# Patient Record
Sex: FEMALE | Race: WHITE | Employment: OTHER | URBAN - METROPOLITAN AREA
[De-identification: names, ages, dates, MRNs, and addresses within clinical notes are randomized per-mention and may not be internally consistent; named-entity substitution may affect disease eponyms.]

---

## 2020-10-23 ENCOUNTER — HOSPITAL ENCOUNTER (OUTPATIENT)
Dept: PHYSICAL THERAPY | Age: 60
Discharge: HOME | End: 2020-10-23
Payer: COMMERCIAL

## 2020-10-23 PROCEDURE — 97110 THERAPEUTIC EXERCISES: CPT | Performed by: PHYSICAL THERAPIST

## 2020-10-23 PROCEDURE — 97162 PT EVAL MOD COMPLEX 30 MIN: CPT | Performed by: PHYSICAL THERAPIST

## 2020-10-23 PROCEDURE — 97140 MANUAL THERAPY 1/> REGIONS: CPT | Performed by: PHYSICAL THERAPIST

## 2020-10-23 NOTE — PROGRESS NOTES
PT  EVAL AND TREATMENT    Patient Name: Chrissy Ortiz  Date:10/23/2020  : 1960  [x]  Patient  Verified  Payor: Ashok Mendoza / Plan: Stella Flank / Product Type: Commerical /    In time:805am  Out time:900  Total Treatment Time (min): 55  Total Timed Codes (min): 55  1:1 Treatment Time ( [address] only): na   Visit #: 1 of 8-10    Treatment Area: Cervicalgia [M54.2]  Left shoulder pain [M25.512]  Right shoulder pain [M25.511]  Pain in: 4  Pain out 4  Hx:  Pt reports she was dx with osteopenia, and she was doing some of the therex the MD gave her (2020), when she felt a pop with resultant pain down between shoulder blades L>R. Pt went back to MD and was given Meloxicam and mm relaxer medication medication. Pt has hx of carpal tunnel since . Pt had to place sx on hold due to covid (for CT). Pt has Hx of c/s fusion 4-6. 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 No recent scans performed. Pt states she will have occasional pain into the 5th digit B, but states her main c/o daily HA and pain up into the head/neck. Pain increases with looking down and using arms (chopping food), lifting dishes into cabinet, transferirng from sit to stand. Mild sleep disturbances.     Objective evaluation:  Posture:decreased c/s lordosis, increased dowager hump at C7, increased t/s kyohosis  Strength: Strength:                                                                     L (1-5) R (1-5) Pain   Flexion 4 4+ [x] Yes   [] No   Abduction 4+ 4+ [x] Yes   [] No   External Rotation 4+ 4+ [] Yes   [] No   Internal Rotation 5 5 [] Yes   [] No   Mid Trap 4 4 [] Yes   [] No   Serratus Anterior NT NT [] Yes   [] No   Lower Trapezius 4- 4- [] Yes   [] No   Elbow Flexion 5 5 [] Yes   [] No   Elbow Extension 4+ 4+ [] Yes   [] No   Unable to perform  on L due to thumb arthritis (pt wearing splint)  ROM: C/S AROM: flex 75% , ext 22deg (mild pain mid shoulder blade L), R lat flex 25deg, L lat flex 25deg (sx in shld blade on L), R rot 25deg, L rot 30deg. UE FIR: decreased B ~T10  RRIS: increases Sx, RPIS: (protrusion): no change  Neurological: WNL, decreased sensation in Med nerve on R hand, mod to severe decreased nerve conduction with EMG per pt report, was scheduled for sx on R hand), R UE: neural tension in med and ulnar nerve. Palpation: L>R UT mm, C7/T1, B ant shoulders,      Justification for Eval Code Complexity:  Patient History : depression, HTn, OA, asthma, visually impaired  Examination see exam as above   Clinical Presentation: evolving  Clinical Decision Making : FOTO : 48 /100    Manual: 8 min  PA t/s mobes (mild), C/s manual traction, SOR, UT TrPR    Modality (rationale): decrease pain  []  E-Stim: type _ x _ min     []att   []unatt   []w/US   []w/ice   []w/heat  []  Traction: []cerv   []pelvic   _ lbs x _ min     []pro   []sup   []int   []const  []  Ultrasound: []cont   []pulse    _ W/cm2 x _  min   []1MHz   []3MHz  []  Iontophoresis: []take home patch w/ dexamethazone    []40mA   []80mA                               []_ mA min w/: []dexamethazone   []other:_  []  Ice pack _  min     [x] Hot pack 10_  min     [] Paraffin _  min  []  Other:     Patient Education: [x] Established HEP    [x] POT (minutes) :15 min HEP    ASSESSMENT  [x]  See Plan of Care    PLAN  [x]  Upgrade activities as tolerated     [x] Other:_ POC  Patient to be seen 2-3 /wk for 8-10 treatments.        Isabell Yeager, PT 10/23/2020  7:57 AM

## 2020-10-23 NOTE — PROGRESS NOTES
[address] [facility] AT [facility]  [address] [place] [address], [address], [address]  Phone [phone]  Fax [phone] / [address] [address] PHYSICAL THERAPY SERVICES  Patient Name: Robyn Munoz : 1960   Medical   Diagnosis: Cervicalgia [M54.2]  Left shoulder pain [M25.512]  Right shoulder pain [M25.511] Treatment Diagnosis: C7/T1 HNP   Onset Date: 2020     Referral Source: Akiko Philippe MD Start of [facility]): 10/23/2020   Prior Hospitalization: See medical history Provider #: [id number]   Prior Level of Function: Indep    Comorbidities: depression, HTn, OA, asthma, visually impaired   Medications: Verified on Patient Summary List   The Plan of Care and following information is based on the information from the initial evaluation.   =================================================================================  Assessment / key information:  Patient is a 62 y.o. female who presents to [facility] with Dx of C7/T1 HNP. Pt reports she was dx with osteopenia, and she was doing some of the therex the MD gave her (2020), when she felt a pop with resultant pain down between shoulder blades L>R. Pt went back to MD and was given Meloxicam and mm relaxer medication medication. Pt has hx of carpal tunnel since . Pt had to place sx on hold due to covid (for CT). Pt has Hx of c/s fusion 4-6. 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 No recent scans performed. Pt states she will have occasional pain into the 5th digit B, but states her main c/o daily HA and pain up into the head/neck. Pain increases with looking down and using arms (chopping food), lifting dishes into cabinet, transferirng from sit to stand.  Mild sleep disturbances.     Objective evaluation:  Posture:decreased c/s lordosis, increased dowager hump at C7, increased t/s kyohosis  Strength: Strength:   L (1-5) R (1-5) Pain   Flexion 4 4+ [x]? Yes   []? No   Abduction 4+ 4+ [x]? Yes   []? No   External Rotation 4+ 4+ []? Yes   []? No   Internal Rotation 5 5 []? Yes   []? No   Mid Trap 4 4 []? Yes   []? No   Serratus Anterior NT NT []? Yes   []? No   Lower Trapezius 4- 4- []? Yes   []? No   Elbow Flexion 5 5 []? Yes   []? No   Elbow Extension 4+ 4+ []? Yes   []? No   Unable to perform  on L due to thumb arthritis (pt wearing splint)  ROM: C/S AROM: flex 75% , ext 22deg (mild pain mid shoulder blade L), R lat flex 25deg, L lat flex 25deg (sx in shld blade on L), R rot 25deg, L rot 30deg. UE FIR: decreased B ~T10  RRIS: increases Sx, RPIS: (protrusion): no change  Neurological: WNL, decreased sensation in Med nerve on R hand, mod to severe decreased nerve conduction with EMG per pt report, was scheduled for sx on R hand), R UE: neural tension in med and ulnar nerve. Palpation: L>R UT mm, C7/T1, B ant shoulders. Patient scored 48 on FOTO indicating decreased functional activity level and QOL. A home exercise program was demonstrated and provided to address the above objective and functional deficits.  Patient can benefit from PT interventions to improve radicular sx, flexibility decrease pain , to facilitate ADLs & overall functional status.   =================================================================================  Eval Complexity: History: MEDIUM  Complexity : 1-2 comorbidities / personal factors will impact the outcome/ POC Exam:MEDIUM Complexity : 3 Standardized tests and measures addressing body structure, function, activity limitation and / or participation in recreation  Presentation: MEDIUM Complexity : Evolving with changing characteristics  Clinical Decision Making:MEDIUM Complexity : FOTO score of 26-74Overall Complexity:MEDIUM  Problem List: pain affecting function, decrease ROM, decrease strength, decrease ADL/ functional abilitiies, decrease activity tolerance and decrease flexibility/ joint mobility   Treatment Plan may include any combination of the following: Therapeutic exercise, Therapeutic activities, Neuromuscular re-education, Physical agent/modality, Gait/balance training, Manual therapy, Aquatic therapy, Patient education, Self Care training, Functional mobility training, Home safety training and Stair training  Patient / Family readiness to learn indicated by: asking questions, trying to perform skills and interest  Persons(s) to be included in education: patient (P)  Barriers to Learning/Limitations: None  Measures taken:    Patient Goal (s): Some easing of symptoms   Patient self reported health status: good  Rehabilitation Potential: good   Short Term Goals: To be accomplished in  2  weeks:  1. Patient will be compliant with HEP for sx management to address the above listed deficits. .  2. Pt to have 50% reduction in radicular sx and HA  to facilitate prolonged positioning for sewing.  Long Term Goals: To be accomplished in  8-10  treatments:  1. Patient to be independent & compliant with HEP in preparation for D/C.   2. Patient to increase FOTO score to 56 indicating improved functional abilities and QOL. 3. Patient to report 75% reduction in radicular sx and HA  to facilitate prolonged positioning for sewing. 4. Patient to increase scapular strength to 5 to facilitate stability at cervico-thoracic junction for lifting and using UEs. Frequency / Duration:   Patient to be seen  2-3  times per week for 8-10  treatments:  Patient / Caregiver education and instruction: activity modification and exercises    Therapist Signature: Morteza Calvo PT Date: 26/18/7830   Certification Period: na Time: 8:00 AM   ===========================================================================================  I certify that the above Physical Therapy Services are being furnished while the patient is under my care.   I agree with the treatment plan and certify that this therapy is necessary. Physician Signature:        Date:       Time:     Please sign and return to [facility] or you may fax the signed copy to [phone]. Thank you.

## 2020-10-26 ENCOUNTER — HOSPITAL ENCOUNTER (OUTPATIENT)
Dept: PHYSICAL THERAPY | Age: 60
Discharge: HOME | End: 2020-10-26
Payer: COMMERCIAL

## 2020-10-26 PROCEDURE — 97014 ELECTRIC STIMULATION THERAPY: CPT

## 2020-10-26 PROCEDURE — 97110 THERAPEUTIC EXERCISES: CPT

## 2020-10-26 PROCEDURE — 97140 MANUAL THERAPY 1/> REGIONS: CPT

## 2020-10-26 NOTE — PROGRESS NOTES
PT DAILY TREATMENT NOTE     Patient Name: Jody Irby  Date:10/26/2020  : 1960  [x]  Patient  Verified  Payor: Lion Bojorquez / Plan: Ty Cruz / Product Type: Commerical /    In time:8:30Out time:9:36  Total Treatment Time (min): 66    Visit #: 2 of     Treatment Area: Cervicalgia [M54.2]  Left shoulder pain [M25.512]  Right shoulder pain [M25.511]    SUBJECTIVE  Pain Level IN: (0-10 scale): 3-4/10   Pain Level OUT: (0-10 scale) post treatment: 2/10    Any medication changes, allergies to medications, adverse drug reactions, diagnosis change, or new procedure performed?: [x] No    [] Yes (see summary sheet for update)  Subjective functional status/changes:   [] No changes reported  I have this horrible pain between my shoulder blades; the (L) side is worse than the (R) and then I will get this pain radiating from my shoulder blade to under my arm pit and down to my pinky at times - it is horrible       OBJECTIVE    Modalities Rationale:     decrease inflammation, decrease pain and increase tissue extensibility to improve patient's ability to perform normal ADLs without increase pain or discomfort   15+5 min [x] Estim, type/location: premod to (B) UT and mid scap in prone                                      []  att     [x]  unatt     []  w/US     []  w/ice    []  w/heat    min []  Mechanical Traction: type/lbs                   []  pro   []  sup   []  int   []  cont    []  before manual    []  after manual    min []  Ultrasound, settings/location:      min []  Iontophoresis w/ dexamethasone, location:                                               []  take home patch       []  in clinic    min []  Ice     []  Heat    location/position:     min []  Vasopneumatic Device, press/temp:     min []  Other:    [] Skin assessment post-treatment (if applicable):    []  intact    []  redness- no adverse reaction     []redness  adverse reaction:             31 min Therapeutic Exercise:  [x] See flow sheet : first follow up visit since initial evaluation - initiated POC per flow sheet    Rationale: increase ROM and increase strength to improve the patients ability to achieve all goals stated below     15 min Manual Therapy: Technique:      [] S/DTM []IASTM []PROM [] Passive Stretching   [] Graston:  [] GT 1  [] GT 2 [] GT 3 [] GT 4 [] GT 4 [] GT 5  [] GT 6  [] Sweep [] Fan [] [place]  [] Brush   []  Swivel []J- Stroke [] Scoop []IFraming     []Manual TPR  [] TDN (see objective; actual needle insertion time not billed)  []Jt manipulation:Gr I [] II []  III [] IV[] V[]  Treatment/Area: DTM to (B) upper thoracic  And TPR to thoracic area approx T1-4 in prone    Rationale:      decrease pain, increase ROM, increase tissue extensibility and decrease trigger points to improve patient's ability to perform normal ADL with less pain and less dysfunction         With   [] TE   [] TA   [] neuro   [] other: Patient Education: [x] Review HEP    [] Progressed/Changed HEP based on:   [] positioning   [] body mechanics   [] transfers   [] heat/ice application    [] Graston Education: Explained the effects and benefits of Graston Technique therapy including potential for post treatment soreness and bruising.   [] Other:           Objective/Functional Measures with Therapist Assessment Noted with Response to Therapy Session:     first follow up visit since initial evaluation -       initiated POC per flow sheet   Patient presents with extreme tightness and painful trigger points directly below CT junction and along the T1-4 transverse process borders   May attempt gentle mobs and MET to assist with correction to area next visit secondary: patient appear to present with transverse process at T2 is protruding posterior on the right with patient in prone with increase pain with palpation over area compare to pain that patient reports that refers into the (L) UE     ASSESSMENT/Changes in Function:     Patient will continue to benefit from skilled PT services to modify and progress therapeutic interventions, address functional mobility deficits, address ROM deficits, address strength deficits, analyze and address soft tissue restrictions, analyze and cue movement patterns, analyze and modify body mechanics/ergonomics and assess and modify postural abnormalities to attain remaining goals. [x]  See Plan of Care  []  See progress note/recertification  []  See Discharge Summary         Progress towards goals / Updated goals: · Short Term Goals: To be accomplished in  2  weeks:  1. Patient will be compliant with HEP for sx management to address the above listed deficits. .  2. Pt to have 50% reduction in radicular sx and HA  to facilitate prolonged positioning for sewing. · Long Term Goals: To be accomplished in  8-10  treatments:  1. Patient to be independent & compliant with HEP in preparation for D/C.   2. Patient to increase FOTO score to 56 indicating improved functional abilities and QOL. 3. Patient to report 75% reduction in radicular sx and HA  to facilitate prolonged positioning for sewing. 4. Patient to increase scapular strength to 5 to facilitate stability at cervico-thoracic junction for lifting and using UEs.     PLAN  [x]  Upgrade activities as tolerated     [x]  Continue plan of care  []  Update interventions per flow sheet       []  Discharge due to:_  []  Other:_      Wes Mandujano PTA 10/26/2020  8:52 AM    Future Appointments   Date Time Provider Kinjal Miramontes   10/28/2020  4:00 PM Saint Satchel, PT [facility]   11/4/2020  8:30 AM [facility] PT [facility] [facility]   11/6/2020  7:45 AM [facility], PTA [facility]   11/9/2020  9:15 AM Tyson Faith DPT [facility]   11/12/2020  9:15 AM Tyson Faith, DPT [facility]   11/16/2020  9:15 AM [facility], PTA [facility]   11/19/2020  7:45 AM Kasey Pimentel [address] [place] [address],[address]

## 2020-10-28 ENCOUNTER — HOSPITAL ENCOUNTER (OUTPATIENT)
Dept: PHYSICAL THERAPY | Age: 60
Discharge: HOME | End: 2020-10-28
Payer: COMMERCIAL

## 2020-10-28 PROCEDURE — 97014 ELECTRIC STIMULATION THERAPY: CPT | Performed by: PHYSICAL THERAPIST

## 2020-10-28 PROCEDURE — 97140 MANUAL THERAPY 1/> REGIONS: CPT | Performed by: PHYSICAL THERAPIST

## 2020-10-28 PROCEDURE — 97110 THERAPEUTIC EXERCISES: CPT | Performed by: PHYSICAL THERAPIST

## 2020-10-28 PROCEDURE — 97012 MECHANICAL TRACTION THERAPY: CPT | Performed by: PHYSICAL THERAPIST

## 2020-10-28 NOTE — PROGRESS NOTES
PT DAILY TREATMENT NOTE     Patient Name: Colette Billings  Date:10/28/2020  : 1960  [x]  Patient  Verified  Payor: Kai Osorio / Plan: Joseph Villaseñor / Product Type: Commerical /    In time:400pm  Out time: 512pm  Total Treatment Time (min): 72    Visit #: 3 of     Treatment Area: Cervicalgia [M54.2]  Left shoulder pain [M25.512]  Right shoulder pain [M25.511]    SUBJECTIVE  Pain Level IN: (0-10 scale): 4/10   Pain Level OUT: (0-10 scale) post treatment: 0/10    Any medication changes, allergies to medications, adverse drug reactions, diagnosis change, or new procedure performed?: [x] No    [] Yes (see summary sheet for update)  Subjective functional status/changes:   [] No changes reported  I had one incidence with the radiating pain yesterday. I felt great after treatment for about 3 hours last time, but then the pain came back.      OBJECTIVE    Modalities Rationale:     decrease inflammation, decrease pain and increase tissue extensibility to improve patient's ability to perform normal ADLs without increase pain or discomfort   15+5 min [x] Estim, type/location: premod to (B) UT and mid scap in prone                                      []  att     [x]  unatt     []  w/US     []  w/ice    []  w/heat   15+3 min [x]  Mechanical Traction: type/lbs  15lbs/8lbs                   []  pro   []  sup   []  int   []  cont    []  before manual    []  after manual    min []  Ultrasound, settings/location:      min []  Iontophoresis w/ dexamethasone, location:                                               []  take home patch       []  in clinic    min []  Ice     []  Heat    location/position:     min []  Vasopneumatic Device, press/temp:     min []  Other:    [] Skin assessment post-treatment (if applicable):    []  intact    []  redness- no adverse reaction     []redness  adverse reaction:             19 min Therapeutic Exercise:  [x] See flow sheet :    Rationale: increase ROM and increase strength to improve the patients ability to achieve all goals stated below     15 min Manual Therapy: Technique:      [] S/DTM []IASTM []PROM [] Passive Stretching   [] Graston:  [] GT 1  [] GT 2 [] GT 3 [] GT 4 [] GT 4 [] GT 5  [] GT 6  [] Sweep [] Fan [] [place]  [] Brush   []  Swivel []J- Stroke [] Scoop []IFraming     []Manual TPR  [] TDN (see objective; actual needle insertion time not billed)  []Jt manipulation:Gr I [] II []  III [] IV[] V[]  Treatment/Area: DTM to (B) upper thoracic  And TPR to thoracic area approx T1-4 in prone    Rationale:      decrease pain, increase ROM, increase tissue extensibility and decrease trigger points to improve patient's ability to perform normal ADL with less pain and less dysfunction         With   [] TE   [] TA   [] neuro   [] other: Patient Education: [x] Review HEP    [] Progressed/Changed HEP based on:   [] positioning   [] body mechanics   [] transfers   [] heat/ice application    [] Graston Education: Explained the effects and benefits of Graston Technique therapy including potential for post treatment soreness and bruising. [] Other:           Objective/Functional Measures with Therapist Assessment Noted with Response to Therapy Session:       ASSESSMENT/Changes in Function:   [facility], to assess is she is to start home traction with her [place] unit. P  Patient will continue to benefit from skilled PT services to modify and progress therapeutic interventions, address functional mobility deficits, address ROM deficits, address strength deficits, analyze and address soft tissue restrictions, analyze and cue movement patterns, analyze and modify body mechanics/ergonomics and assess and modify postural abnormalities to attain remaining goals. [x]  See Plan of Care  []  See progress note/recertification  []  See Discharge Summary         Progress towards goals / Updated goals: · Short Term Goals: To be accomplished in  2  weeks:  1.  Patient will be compliant with HEP for sx management to address the above listed deficits. .  2. Pt to have 50% reduction in radicular sx and HA  to facilitate prolonged positioning for sewing. · Long Term Goals: To be accomplished in  8-10  treatments:  1. Patient to be independent & compliant with HEP in preparation for D/C.   2. Patient to increase FOTO score to 56 indicating improved functional abilities and QOL. 3. Patient to report 75% reduction in radicular sx and HA  to facilitate prolonged positioning for sewing. 4. Patient to increase scapular strength to 5 to facilitate stability at cervico-thoracic junction for lifting and using UEs.     PLAN  [x]  Upgrade activities as tolerated     [x]  Continue plan of care  []  Update interventions per flow sheet       []  Discharge due to:_  []  Other:_      Jenny Garcias, PT 10/28/2020  8:52 AM    Future Appointments   Date Time Provider Kinjal Miramontes   10/28/2020  4:00 PM Savanah Mendez, PT [facility]   11/4/2020  8:30 AM [facility] PT [place] [facility]   11/6/2020  7:45 AM Prosper Horta PTA [facility]   11/9/2020  9:15 AM TELMA LandisT [facility]   11/12/2020  9:15 AM Tatiana Hanson DPT [facility]   11/16/2020  9:15 AM Prosper Horta PTA [facility]   11/19/2020  7:45 AM Geremias Anthony, DPT [facility]

## 2020-11-04 ENCOUNTER — HOSPITAL ENCOUNTER (OUTPATIENT)
Dept: PHYSICAL THERAPY | Age: 60
Discharge: HOME | End: 2020-11-04
Payer: COMMERCIAL

## 2020-11-04 PROCEDURE — 97140 MANUAL THERAPY 1/> REGIONS: CPT | Performed by: GENERAL ACUTE CARE HOSPITAL

## 2020-11-04 PROCEDURE — 97014 ELECTRIC STIMULATION THERAPY: CPT | Performed by: GENERAL ACUTE CARE HOSPITAL

## 2020-11-04 PROCEDURE — 97110 THERAPEUTIC EXERCISES: CPT | Performed by: GENERAL ACUTE CARE HOSPITAL

## 2020-11-04 NOTE — PROGRESS NOTES
PT DAILY TREATMENT NOTE     Patient Name: Lindsey Landa  Date:2020  : 1960  [x]  Patient  Verified  Payor: Fartun Crawley / Plan: Carlos Glover / Product Type: Commerical /    In time: 352 Out time: 926  Total Treatment Time (min): 53  Visit #: 4 of     Treatment Area: Cervicalgia [M54.2]  Left shoulder pain [M25.512]  Right shoulder pain [M25.511]    SUBJECTIVE  Pain Level IN: (0-10 scale): 3/10   Pain Level OUT: (0-10 scale) post treatment: 0/10     Any medication changes, allergies to medications, adverse drug reactions, diagnosis change, or new procedure performed?: [x] No    [] Yes (see summary sheet for update)  Subjective functional status/changes:   [] No changes reported  Pt reports having muscle spasms for 2 days after performing the cervical traction last session. States she had to take a muscle relaxer to finally get relief.      OBJECTIVE  Modalities Rationale:     decrease inflammation, decrease pain and increase tissue extensibility to improve patient's ability to perform normal ADLs without increase pain or discomfort   15 min [x] Estim, type/location: premod to (B) UT and mid scap in prone                                      []  att     [x]  unatt     []  w/US     []  w/ice    []  w/heat   Held  min [x]  Mechanical Traction: type/lbs  15lbs/8lbs                   []  pro   []  sup   []  int   []  cont    []  before manual    []  after manual    min []  Ultrasound, settings/location:      min []  Iontophoresis w/ dexamethasone, location:                                               []  take home patch       []  in clinic    min []  Ice     []  Heat    location/position:     min []  Vasopneumatic Device, press/temp:     min []  Other:    [] Skin assessment post-treatment (if applicable):    []  intact    []  redness- no adverse reaction     []redness  adverse reaction:             23 min Therapeutic Exercise:  [x] See flow sheet :    Rationale: increase ROM and increase strength to improve the patients ability to achieve all goals stated below     15 min Manual Therapy: Technique:      [] S/DTM []IASTM []PROM [] Passive Stretching   [] Graston:  [] GT 1  [] GT 2 [] GT 3 [] GT 4 [] GT 4 [] GT 5  [] GT 6  [] Sweep [] Fan [] [place]  [] Brush   []  Swivel []J- Stroke [] Scoop []IFraming     []Manual TPR  [] TDN (see objective; actual needle insertion time not billed)  []Jt manipulation:Gr I [] II []  III [] IV[] V[]  Treatment/Area: DTM to (B) UT, LS, thoracic paraspinals, rhonboids. TPR to thoracic area approx T1-4 in prone    Rationale:      decrease pain, increase ROM, increase tissue extensibility and decrease trigger points to improve patient's ability to perform normal ADL with less pain and less dysfunction         With   [] TE   [] TA   [] neuro   [] other: Patient Education: [x] Review HEP    [] Progressed/Changed HEP based on:   [] positioning   [] body mechanics   [] transfers   [] heat/ice application    [] Graston Education: Explained the effects and benefits of Graston Technique therapy including potential for post treatment soreness and bruising. [] Other:      Objective/Functional Measures with Therapist Assessment Noted with Response to Therapy Session:   Added TB rows    ASSESSMENT/Changes in Function:   Pt reported an adverse effect from cervical traction, therefore held this today. Good response to postural corrective exercises to address upper back symptoms. Reports an overall improvement in radicular s/sx since beginning physical therapy. Noted trigger points in B UT with increased tenderness on R > L side, though patients pain is typically on L side.      Patient will continue to benefit from skilled PT services to modify and progress therapeutic interventions, address functional mobility deficits, address ROM deficits, address strength deficits, analyze and address soft tissue restrictions, analyze and cue movement patterns, analyze and modify body mechanics/ergonomics and assess and modify postural abnormalities to attain remaining goals. [x]  See Plan of Care  []  See progress note/recertification  []  See Discharge Summary         Progress towards goals / Updated goals: · Short Term Goals: To be accomplished in  2  weeks:  1. Patient will be compliant with HEP for sx management to address the above listed deficits. .  2. Pt to have 50% reduction in radicular sx and HA  to facilitate prolonged positioning for sewing. Fluctuating; reports general improvement since starting PT (11/4/20)  · Long Term Goals: To be accomplished in  8-10  treatments:  1. Patient to be independent & compliant with HEP in preparation for D/C.   2. Patient to increase FOTO score to 56 indicating improved functional abilities and QOL. 3. Patient to report 75% reduction in radicular sx and HA  to facilitate prolonged positioning for sewing. 4. Patient to increase scapular strength to 5 to facilitate stability at cervico-thoracic junction for lifting and using UEs.     PLAN  [x]  Upgrade activities as tolerated     [x]  Continue plan of care  []  Update interventions per flow sheet       []  Discharge due to:_  []  Other:_      Cherylene Latin, PT 11/4/2020  8:52 AM    Future Appointments   Date Time Provider Kinjal Miramontes   11/6/2020  7:45 AM Jaylene Mortimer, PTA [facility]   11/9/2020  9:15 AM [facility] PT [facility]   11/12/2020  9:15 AM Adelaida Koroma DPT [facility]   11/16/2020  9:15 AM Jaylene Mortimer, PTA [facility]   11/19/2020  7:45 AM Livier Coles DPT [facility]

## 2020-11-06 ENCOUNTER — APPOINTMENT (OUTPATIENT)
Dept: PHYSICAL THERAPY | Age: 60
End: 2020-11-06
Payer: COMMERCIAL

## 2020-11-09 ENCOUNTER — HOSPITAL ENCOUNTER (OUTPATIENT)
Dept: PHYSICAL THERAPY | Age: 60
Discharge: HOME | End: 2020-11-09
Payer: COMMERCIAL

## 2020-11-09 PROCEDURE — 97110 THERAPEUTIC EXERCISES: CPT

## 2020-11-09 PROCEDURE — 97014 ELECTRIC STIMULATION THERAPY: CPT

## 2020-11-09 PROCEDURE — 97140 MANUAL THERAPY 1/> REGIONS: CPT

## 2020-11-09 NOTE — PROGRESS NOTES
PT DAILY TREATMENT NOTE     Patient Name: Carlos Rossi  Date:2020  : 1960  [x]  Patient  Verified  Payor: Thea Galeano / Plan: Haylee Tucker / Product Type: Commerical /    In time: 9:17 Out time: 10:15  Total Treatment Time (min): 58  Visit #: 5 of     Treatment Area: Cervicalgia [M54.2]  Left shoulder pain [M25.512]  Right shoulder pain [M25.511]    SUBJECTIVE  Pain Level IN: (0-10 scale): 4/10  Pain Level OUT: (0-10 scale) post treatment:1/10    Any medication changes, allergies to medications, adverse drug reactions, diagnosis change, or new procedure performed?: [x] No    [] Yes (see summary sheet for update)  Subjective functional status/changes:   [] No changes reported  That spot is still there -between my shoulder blades it really hasn't changes at all     OBJECTIVE  Modalities Rationale:     decrease inflammation, decrease pain and increase tissue extensibility to improve patient's ability to perform normal ADLs without increase pain or discomfort   15 min [x] Estim, type/location: premod to (B) UT and mid scap in prone                                      []  att     [x]  unatt     []  w/US     [x]  w/ice    []  w/heat   Held  min [x]  Mechanical Traction: type/lbs  15lbs/8lbs                   []  pro   []  sup   []  int   []  cont    []  before manual    []  after manual    min []  Ultrasound, settings/location:      min []  Iontophoresis w/ dexamethasone, location:                                               []  take home patch       []  in clinic    min []  Ice     []  Heat    location/position:     min []  Vasopneumatic Device, press/temp:     min []  Other:    [] Skin assessment post-treatment (if applicable):    []  intact    []  redness- no adverse reaction     []redness  adverse reaction:             23/18 min Therapeutic Exercise:  [x] See flow sheet : 5 min NC for warm up    Rationale: increase ROM and increase strength to improve the patients ability to achieve all goals stated below     20 min Manual Therapy: Technique:      [] S/DTM []IASTM []PROM [] Passive Stretching   [] Graston:  [] GT 1  [] GT 2 [] GT 3 [] GT 4 [] GT 4 [] GT 5  [] GT 6  [] Sweep [] Fan [] [place]  [] Brush   []  Swivel []J- Stroke [] Scoop []IFraming     []Manual TPR  [] TDN (see objective; actual needle insertion time not billed)  []Jt manipulation:Gr I [] II []  III [] IV[] V[]  Treatment/Area: DTM to (B) UT, LS, thoracic paraspinals, rhonboids. TPR to thoracic area approx T1-4 in prone in sitting performed MET to approx T2-4 for (L) rotation lesion and (L) posterior rib    Rationale:      decrease pain, increase ROM, increase tissue extensibility and decrease trigger points to improve patient's ability to perform normal ADL with less pain and less dysfunction         With   [] TE   [] TA   [] neuro   [] other: Patient Education: [x] Review HEP    [] Progressed/Changed HEP based on:   [] positioning   [] body mechanics   [] transfers   [] heat/ice application    [] Graston Education: Explained the effects and benefits of Graston Technique therapy including potential for post treatment soreness and bruising. [] Other:      Objective/Functional Measures with Therapist Assessment Noted with Response to Therapy Session:  Secondary to no real change in overall status - checked for rib dysfunction and rotation lesion to the upper thoracic spine over the (L) side - secondary to patient reported a \"pop\" in this area prior to onset of symptoms   Patient presented with (L) rotation lesion and (L) side posterior rib dysfunction - in sitting performed MET to approx T2-4 for (L) rotation lesion and (L) posterior rib   Patient reported decreased pain with palpation over this area after performing METS    GOALS   1. Patient will be compliant with HEP for sx management to address the above listed deficits. .patient reports compliance of HEP 11/9/2020    ASSESSMENT/Changes in Function:   Patient will continue to benefit from skilled PT services to modify and progress therapeutic interventions, address functional mobility deficits, address ROM deficits, address strength deficits, analyze and address soft tissue restrictions, analyze and cue movement patterns, analyze and modify body mechanics/ergonomics and assess and modify postural abnormalities to attain remaining goals. [x]  See Plan of Care  []  See progress note/recertification  []  See Discharge Summary         Progress towards goals / Updated goals: · Short Term Goals: To be accomplished in  2  weeks:  1. Patient will be compliant with HEP for sx management to address the above listed deficits. .patient reports compliance of HEP 11/9/2020  2. Pt to have 50% reduction in radicular sx and HA  to facilitate prolonged positioning for sewing. Fluctuating; reports general improvement since starting PT (11/4/20)  · Long Term Goals: To be accomplished in  8-10  treatments:  1. Patient to be independent & compliant with HEP in preparation for D/C.   2. Patient to increase FOTO score to 56 indicating improved functional abilities and QOL. 3. Patient to report 75% reduction in radicular sx and HA  to facilitate prolonged positioning for sewing. 4. Patient to increase scapular strength to 5 to facilitate stability at cervico-thoracic junction for lifting and using UEs.     PLAN  [x]  Upgrade activities as tolerated     [x]  Continue plan of care  []  Update interventions per flow sheet       []  Discharge due to:_  []  Other:_      Fawn Stephenson PTA 11/9/2020  8:52 AM    Future Appointments   Date Time Provider Kinjal Miramontes   11/12/2020  9:15 AM Jayashree Moses DPT [facility]   11/16/2020  9:15 AM Anne Day PTA [facility]   11/19/2020  7:45 AM TELMA WeinerT [facility]

## 2020-11-12 ENCOUNTER — APPOINTMENT (OUTPATIENT)
Dept: PHYSICAL THERAPY | Age: 60
End: 2020-11-12
Payer: COMMERCIAL

## 2020-11-13 ENCOUNTER — HOSPITAL ENCOUNTER (OUTPATIENT)
Dept: PHYSICAL THERAPY | Age: 60
Discharge: HOME | End: 2020-11-13
Payer: COMMERCIAL

## 2020-11-13 PROCEDURE — 97140 MANUAL THERAPY 1/> REGIONS: CPT

## 2020-11-13 PROCEDURE — 97014 ELECTRIC STIMULATION THERAPY: CPT

## 2020-11-13 PROCEDURE — 97035 APP MDLTY 1+ULTRASOUND EA 15: CPT

## 2020-11-13 PROCEDURE — 97110 THERAPEUTIC EXERCISES: CPT

## 2020-11-13 NOTE — PROGRESS NOTES
PT DAILY TREATMENT NOTE     Patient Name: Chepe Gagnon  Date:2020  : 1960  [x]  Patient  Verified  Payor: Kellee Youngblood / Plan: Bill Woods / Product Type: Commerical /    In time: 7:47 Out time: 8:53  Total Treatment Time (min): 77  Visit #: 6 of     Treatment Area: Cervicalgia [M54.2]  Left shoulder pain [M25.512]  Right shoulder pain [M25.511]    SUBJECTIVE  Pain Level IN: (0-10 scale): 5-6/10  Pain Level OUT: (0-10 scale) post treatment:3/10  Any medication changes, allergies to medications, adverse drug reactions, diagnosis change, or new procedure performed?: [x] No    [] Yes (see summary sheet for update)  Subjective functional status/changes:   [] No changes reported  I felt good after I was here but then I have been really sore since - I got the x-ray done so hopefully they will now approve a MRI for it    OBJECTIVE  Modalities Rationale:     decrease inflammation, decrease pain and increase tissue extensibility to improve patient's ability to perform normal ADLs without increase pain or discomfort   15 min [x] Estim, type/location: premod to (B) UT and mid scap in prone                                      []  att     [x]  unatt     []  w/US     [x]  w/ice    []  w/heat   Held  min [x]  Mechanical Traction: type/lbs  15lbs/8lbs                   []  pro   []  sup   []  int   []  cont    []  before manual    []  after manual   8 min [x]  Ultrasound, settings/location:  (L) thoracic area approx T3-6    min []  Iontophoresis w/ dexamethasone, location:                                               []  take home patch       []  in clinic    min []  Ice     []  Heat    location/position:     min []  Vasopneumatic Device, press/temp:     min []  Other:    [] Skin assessment post-treatment (if applicable):    []  intact    []  redness- no adverse reaction     []redness  adverse reaction:             28/23 min Therapeutic Exercise:  [x] See flow sheet : 5 min NC for warm up   Added laying supine over full foam roll for 2 min  Thoracic extension over 1/2 foam   Added high \"T\" and then \"M\" with t-band     Rationale: increase ROM and increase strength to improve the patients ability to achieve all goals stated below     15 min Manual Therapy: Technique:      [] S/DTM []IASTM []PROM [] Passive Stretching   [] Graston:  [] GT 1  [] GT 2 [] GT 3 [] GT 4 [] GT 4 [] GT 5  [] GT 6  [] Sweep [] Fan [] [place]  [] Brush   []  Swivel []J- Stroke [] Scoop []IFraming     []Manual TPR  [] TDN (see objective; actual needle insertion time not billed)  []Jt manipulation:Gr I [] II []  III [] IV[] V[]  Treatment/Area: DTM to (B) UT, LS, thoracic paraspinals, rhomboids. TPR to thoracic area approx T1-4 in prone in sitting performed MET to approx T2-4 for (L)) and (R) rotation lesion and in supine AP mobs to (L) rib and T2-4 and PA mobs to t-spine in prone    Rationale:      decrease pain, increase ROM, increase tissue extensibility and decrease trigger points to improve patient's ability to perform normal ADL with less pain and less dysfunction         With   [] TE   [] TA   [] neuro   [] other: Patient Education: [x] Review HEP    [] Progressed/Changed HEP based on:   [] positioning   [] body mechanics   [] transfers   [] heat/ice application    [] Graston Education: Explained the effects and benefits of Graston Technique therapy including potential for post treatment soreness and bruising.   [] Other:      Objective/Functional Measures with Therapist Assessment Noted with Response to Therapy Session:  Presents with decrease palpable rotation lesion with less pain with palpation to approx T4 area prior to MET and mobs to area   Patient reports \"tenderness\" to (L) T4 area after MET and mobs to the soft tissue area - performed US to area to assist with decreasing muscle soreness/tenderness   Added additional exercises to assist with thoracic mobs - see above     ASSESSMENT/Changes in Function:   Patient will continue to benefit from skilled PT services to modify and progress therapeutic interventions, address functional mobility deficits, address ROM deficits, address strength deficits, analyze and address soft tissue restrictions, analyze and cue movement patterns, analyze and modify body mechanics/ergonomics and assess and modify postural abnormalities to attain remaining goals. [x]  See Plan of Care  []  See progress note/recertification  []  See Discharge Summary         Progress towards goals / Updated goals: · Short Term Goals: To be accomplished in  2  weeks:  1. Patient will be compliant with HEP for sx management to address the above listed deficits. .patient reports compliance of HEP 11/9/2020  2. Pt to have 50% reduction in radicular sx and HA  to facilitate prolonged positioning for sewing. Fluctuating; reports general improvement since starting PT (11/4/20)  · Long Term Goals: To be accomplished in  8-10  treatments:  1. Patient to be independent & compliant with HEP in preparation for D/C.   2. Patient to increase FOTO score to 56 indicating improved functional abilities and QOL. 3. Patient to report 75% reduction in radicular sx and HA  to facilitate prolonged positioning for sewing. 4. Patient to increase scapular strength to 5 to facilitate stability at cervico-thoracic junction for lifting and using UEs.     PLAN  [x]  Upgrade activities as tolerated     [x]  Continue plan of care  []  Update interventions per flow sheet       []  Discharge due to:_  []  Other:_      Bertha Vargas [facility] [place] 11/13/2020  8:52 AM    Future Appointments   Date Time Provider Kinjal Miramontes   11/16/2020  9:15 AM Prosper Horta PTA [facility]   11/19/2020  7:45 AM Campos Floyd PTA [facility]

## 2020-11-16 ENCOUNTER — HOSPITAL ENCOUNTER (OUTPATIENT)
Dept: PHYSICAL THERAPY | Age: 60
Discharge: HOME | End: 2020-11-16
Payer: COMMERCIAL

## 2020-11-16 PROCEDURE — 97014 ELECTRIC STIMULATION THERAPY: CPT

## 2020-11-16 PROCEDURE — 97035 APP MDLTY 1+ULTRASOUND EA 15: CPT

## 2020-11-16 PROCEDURE — 97110 THERAPEUTIC EXERCISES: CPT

## 2020-11-16 NOTE — PROGRESS NOTES
PT DAILY TREATMENT NOTE     Patient Name: Arnol Hines  Date:2020  : 1960  [x]  Patient  Verified  Payor: Francisca Villafana / Plan: Jake Huynh / Product Type: Commerical /    In time: 9:19 Out time: 10:23  Total Treatment Time (min): 64  Visit #: 7 of     Treatment Area: Cervicalgia [M54.2]  Left shoulder pain [M25.512]  Right shoulder pain [M25.511]    SUBJECTIVE  Pain Level IN: (0-10 scale): 7/10  Pain Level OUT: (0-10 scale) post treatment:3/10  Any medication changes, allergies to medications, adverse drug reactions, diagnosis change, or new procedure performed?: [x] No    [] Yes (see summary sheet for update)  Subjective functional status/changes:   [] No changes reported  I felt good until the tingle (e-stim) went away after the last time I was here and then after that I was in a lot of pain over the weekend but I also sat behind a sewing machine, so I am sure that didn't help either.  I do go and see the doctor next week     OBJECTIVE  Modalities Rationale:     decrease inflammation, decrease pain and increase tissue extensibility to improve patient's ability to perform normal ADLs without increase pain or discomfort   15+5 set up min [x] Estim, type/location: premod to (B) UT and mid scap in prone                                      []  att     [x]  unatt     []  w/US     [x]  w/ice    []  w/heat   Held  min [x]  Mechanical Traction: type/lbs  15lbs/8lbs                   []  pro   []  sup   []  int   []  cont    []  before manual    []  after manual   8+2 setup min [x]  Ultrasound, settings/location:  (B) thoracic area approx T3-6 - 4 min each     min []  Iontophoresis w/ dexamethasone, location:                                               []  take home patch       []  in clinic    min []  Ice     []  Heat    location/position:     min []  Vasopneumatic Device, press/temp:     min []  Other:    [] Skin assessment post-treatment (if applicable):    []  intact    []  redness- no adverse reaction     []redness  adverse reaction:             34/29 min Therapeutic Exercise:  [x] See flow sheet : 5 min NC for warm up       Rationale: increase ROM and increase strength to improve the patients ability to achieve all goals stated below     held min Manual Therapy: Technique:      [] S/DTM []IASTM []PROM [] Passive Stretching   [] Graston:  [] GT 1  [] GT 2 [] GT 3 [] GT 4 [] GT 4 [] GT 5  [] GT 6  [] Sweep [] Fan [] [place]  [] Brush   []  Swivel []J- Stroke [] Scoop []IFraming     []Manual TPR  [] TDN (see objective; actual needle insertion time not billed)  []Jt manipulation:Gr I [] II []  III [] IV[] V[]  Treatment/Area: DTM to (B) UT, LS, thoracic paraspinals, rhomboids. TPR to thoracic area approx T1-4 in prone in sitting performed MET to approx T2-4 for (L)) and (R) rotation lesion and in supine AP mobs to (L) rib and T2-4 and PA mobs to t-spine in prone    Rationale:      decrease pain, increase ROM, increase tissue extensibility and decrease trigger points to improve patient's ability to perform normal ADL with less pain and less dysfunction         With   [] TE   [] TA   [] neuro   [] other: Patient Education: [x] Review HEP    [] Progressed/Changed HEP based on:   [] positioning   [] body mechanics   [] transfers   [] heat/ice application    [] Graston Education: Explained the effects and benefits of Graston Technique therapy including potential for post treatment soreness and bruising. [] Other:      Objective/Functional Measures with Therapist Assessment Noted with Response to Therapy Session:  Checked thoracic spine/vertebrae for any rotation lesion - none palpated - only some myofascial tightness felt over bilateral sides of vertebrae - held manual and performed US to both sides after strengthen exercises to the thoracic spine to assist with decreasing myofascial restriction noted. GOALS  2.  Pt to have 50% reduction in radicular sx and HA  to facilitate prolonged positioning for sewing. not met 11/16/2020    ASSESSMENT/Changes in Function:   Patient will continue to benefit from skilled PT services to modify and progress therapeutic interventions, address functional mobility deficits, address ROM deficits, address strength deficits, analyze and address soft tissue restrictions, analyze and cue movement patterns, analyze and modify body mechanics/ergonomics and assess and modify postural abnormalities to attain remaining goals. [x]  See Plan of Care  []  See progress note/recertification  []  See Discharge Summary         Progress towards goals / Updated goals: · Short Term Goals: To be accomplished in  2  weeks:  1. Patient will be compliant with HEP for sx management to address the above listed deficits. .patient reports compliance of HEP 11/9/2020  2. Pt to have 50% reduction in radicular sx and HA  to facilitate prolonged positioning for sewing. not met 11/16/2020  · Long Term Goals: To be accomplished in  8-10  treatments:  1. Patient to be independent & compliant with HEP in preparation for D/C.   2. Patient to increase FOTO score to 56 indicating improved functional abilities and QOL. 3. Patient to report 75% reduction in radicular sx and HA  to facilitate prolonged positioning for sewing. 4. Patient to increase scapular strength to 5 to facilitate stability at cervico-thoracic junction for lifting and using UEs.     PLAN  [x]  Upgrade activities as tolerated     [x]  Continue plan of care  []  Update interventions per flow sheet       []  Discharge due to:_  []  Other:_      Tammy Sanches PTA 11/16/2020  8:52 AM    Future Appointments   Date Time Provider Kinjal Miramontes   11/19/2020  7:45 AM Skinny Ennis Other, PTA [facility]

## 2020-11-19 ENCOUNTER — HOSPITAL ENCOUNTER (OUTPATIENT)
Dept: PHYSICAL THERAPY | Age: 60
Discharge: HOME | End: 2020-11-19
Payer: COMMERCIAL

## 2020-11-19 PROCEDURE — 97110 THERAPEUTIC EXERCISES: CPT

## 2020-11-19 PROCEDURE — 97035 APP MDLTY 1+ULTRASOUND EA 15: CPT

## 2020-11-19 PROCEDURE — 97014 ELECTRIC STIMULATION THERAPY: CPT

## 2020-11-19 NOTE — PROGRESS NOTES
PT DAILY TREATMENT NOTE     Patient Name: Crys Benton  Date:2020  : 1960  [x]  Patient  Verified  Payor: Kathie Sotelo / Plan: Paula Barriga / Product Type: Commerical /    In time: 7:48 Out time: 8:57  Total Treatment Time (min): 69  Visit #: 8of     Treatment Area: Cervicalgia [M54.2]  Left shoulder pain [M25.512]  Right shoulder pain [M25.511]    SUBJECTIVE  Pain Level IN: (0-10 scale): 1/10  Pain Level OUT: (0-10 scale) post treatment:0/10  Any medication changes, allergies to medications, adverse drug reactions, diagnosis change, or new procedure performed?: [x] No    [] Yes (see summary sheet for update)  Subjective functional status/changes:   [] No changes reported   I am doing okay today considering I helped my daughter with making bread so I had to use my hands a lot with kneading     OBJECTIVE  Modalities Rationale:     decrease inflammation, decrease pain and increase tissue extensibility to improve patient's ability to perform normal ADLs without increase pain or discomfort   15+5 set up min [x] Estim, type/location: premod to (B) UT and mid scap in prone                                      []  att     [x]  unatt     []  w/US     [x]  w/ice    []  w/heat   Held  min [x]  Mechanical Traction: type/lbs  15lbs/8lbs                   []  pro   []  sup   []  int   []  cont    []  before manual    []  after manual   8+2 setup min [x]  Ultrasound, settings/location:  (B) thoracic area approx T3-6 - 4 min each     min []  Iontophoresis w/ dexamethasone, location:                                               []  take home patch       []  in clinic    min []  Ice     []  Heat    location/position:     min []  Vasopneumatic Device, press/temp:     min []  Other:    [] Skin assessment post-treatment (if applicable):    []  intact    []  redness- no adverse reaction     []redness  adverse reaction:             39/34 min Therapeutic Exercise:  [x] See flow sheet : 5 min NC for warm up Rationale: increase ROM and increase strength to improve the patients ability to achieve all goals stated below     held min Manual Therapy: Technique:      [] S/DTM []IASTM []PROM [] Passive Stretching   [] Graston:  [] GT 1  [] GT 2 [] GT 3 [] GT 4 [] GT 4 [] GT 5  [] GT 6  [] Sweep [] Fan [] Daisetta  [] Brush   []  Swivel []J- Stroke [] Scoop []IFraming     []Manual TPR  [] TDN (see objective; actual needle insertion time not billed)  []Jt manipulation:Gr I [] II []  III [] IV[] V[]  Treatment/Area: DTM to (B) UT, LS, thoracic paraspinals, rhomboids. TPR to thoracic area approx T1-4 in prone in sitting performed MET to approx T2-4 for (L)) and (R) rotation lesion and in supine AP mobs to (L) rib and T2-4 and PA mobs to t-spine in prone    Rationale:      decrease pain, increase ROM, increase tissue extensibility and decrease trigger points to improve patient's ability to perform normal ADL with less pain and less dysfunction         With   [] TE   [] TA   [] neuro   [] other: Patient Education: [x] Review HEP    [] Progressed/Changed HEP based on:   [] positioning   [] body mechanics   [] transfers   [] heat/ice application    [] Graston Education: Explained the effects and benefits of Graston Technique therapy including potential for post treatment soreness and bruising.   [] Other:      Objective/Functional Measures with Therapist Assessment Noted with Response to Therapy Session:    Patient tolerate all exercises well to increase strength and stability to the cervical thoracic area  Patient was able to perform two activities at home - sewing and making bread with only some increase soreness afterwards - between scapula - the (L) >(R)   Patient will f/u with MD on 11/23/2020 for follow up from recent cervical x-rays    ASSESSMENT/Changes in Function:   Patient will continue to benefit from skilled PT services to modify and progress therapeutic interventions, address functional mobility deficits, address ROM deficits, address strength deficits, analyze and address soft tissue restrictions, analyze and cue movement patterns, analyze and modify body mechanics/ergonomics and assess and modify postural abnormalities to attain remaining goals. [x]  See Plan of Care  []  See progress note/recertification  []  See Discharge Summary         Progress towards goals / Updated goals: · Short Term Goals: To be accomplished in  2  weeks:  1. Patient will be compliant with HEP for sx management to address the above listed deficits. .patient reports compliance of HEP 11/9/2020  2. Pt to have 50% reduction in radicular sx and HA  to facilitate prolonged positioning for sewing. not met 11/16/2020  · Long Term Goals: To be accomplished in  8-10  treatments:  1. Patient to be independent & compliant with HEP in preparation for D/C.   2. Patient to increase FOTO score to 56 indicating improved functional abilities and QOL. 3. Patient to report 75% reduction in radicular sx and HA  to facilitate prolonged positioning for sewing. 4. Patient to increase scapular strength to 5 to facilitate stability at cervico-thoracic junction for lifting and using UEs. PLAN  [x]  Upgrade activities as tolerated     [x]  Continue plan of care  []  Update interventions per flow sheet       []  Discharge due to:_  []  Other:_      Asha Torres, NICANOR 11/19/2020  8:52 AM    No future appointments.

## 2021-02-08 NOTE — PROGRESS NOTES
[address] [facility] AT [facility]  [address] [facility] [address] [place] [address],[address], [place], [place]  Phone [phone]  Fax  SUMMARY  Patient Name: Humberto Larsen : 1960   Treatment/Medical Diagnosis: Cervicalgia [M54.2]  Left shoulder pain [M25.512]  Right shoulder pain [M25.511]   Referral Source: Neha Hagen MD     Date of Initial Visit: 10/23/2020 Attended Visits: 8 Missed Visits: 0       Summary of Care: Thank you for referring this pleasant patient. Chau Bowser has completed 8 proposed sessions   Patient did not return to physical therapy after 8th visit - reason unknown  Will discharge patient at this time and if patient contact office after today will advise patient that is any additional follow up or recommendation is needed to return to referring physician. Reason for Discharge:  [x] The patient was non-compliant with attendance. [x] The patient could not be contacted to schedule further therapy sessions. [] The patient has been discharged based on the orders of the referring physician.  [] The patient has requested to be discharged due to:   [] Patient has met all goals or max benefit has been achieved      If you have any questions/comments please contact us directly at [phone]. Thank you for allowing us to assist in the care of your patient.     Therapist Signature: Naeem Castellano, NICANOR Date: 2021   Therapist   Signature  Reema Galeano, RITCHIE Time: 7:52 AM

## 2022-03-08 ENCOUNTER — HOSPITAL ENCOUNTER (OUTPATIENT)
Dept: PHYSICAL THERAPY | Age: 62
Discharge: HOME | End: 2022-03-08
Payer: COMMERCIAL

## 2022-03-08 PROCEDURE — 97110 THERAPEUTIC EXERCISES: CPT

## 2022-03-08 PROCEDURE — 97140 MANUAL THERAPY 1/> REGIONS: CPT

## 2022-03-08 PROCEDURE — 97162 PT EVAL MOD COMPLEX 30 MIN: CPT

## 2022-03-08 NOTE — PROGRESS NOTES
PT DAILY TREATMENT NOTE     Patient Name: Winnie Keen  Date:3/8/2022  : 1960  [x]  Patient  Verified  Payor: Alfred Day / Plan: Pj Herron / Product Type: Commerical /    In time:2:47  Out time: 3:33  Total Treatment Time (min): 46  Visit #: 1 of       Treatment Area: Other low back pain [M54.59]    SUBJECTIVE  Pain Level (0-10 scale): 0.5/10   Any medication changes, allergies to medications, adverse drug reactions, diagnosis change, or new procedure performed?: [x] No    [] Yes (see summary sheet for update)  Subjective functional status/changes:   [] No changes reported  The patient presents with right sided low back pain and radicular symptoms to her right foot that is chronic condition with acute exacerbation about 2 months ago. The patient denies falls or traumatic injury. X-rays shows anterolisthesis at L4-5 and arthritis per patient report. The patient reports increased pain with prolonged standing, sitting, getting into/out of the car. She is currently taking Mobic for pain relief. The patient enjoys crafting and gardening and has pain with both activities. The patient verbalized and indicated on her intake form no fear of falling. The patient's goals for physical therapy are to end or lessen pain. FOTO: 50/100.      OBJECTIVE    Modality rationale: decrease inflammation and decrease pain to improve the patients standing tolerance   Min Type Additional Details    [] Estim:  []Unatt       []IFC  []Premod                        []Other:  []w/ice   []w/heat  Position:  Location:    [] Estim: []Att    []TENS instruct  []NMES                    []Other:  []w/US   []w/ice   []w/heat  Position:  Location:    []  Traction: [] Cervical       []Lumbar                       [] Prone          []Supine                       []Intermittent   []Continuous Lbs:  [] before manual  [] after manual    []  Ultrasound: []Continuous   [] Pulsed                           []1MHz   []3MHz W/cm2:  Location: []  Iontophoresis with dexamethasone         Location: [] Take home patch   [] In clinic    []  Ice     []  heat  []  Ice massage  []  Laser   []  Anodyne Position:  Location:    []  Laser with stim  []  Other:  Position:  Location:    []  Vasopneumatic Device  Pre-treatment girth:   Post-treatment girth:   Measured at landmark location:  Pressure:       [] lo [] med [] hi   Temperature: [] lo [] med [] hi   [] Skin assessment post-treatment:  []intact []redness- no adverse reaction    []redness - adverse reaction:   Vasopnuematic compression justification:  Per bilateral girth measures taken and listed above the edema is considered significant and having an impact on the patient's balance and gait    23 min [x]Eval                  []Re-Eval       15 min Therapeutic Exercise:  [] See flow sheet :  HEP development and instruction   Posterior pelvic tilt  Bridge with PPT   Straight leg raise    Rationale: increase ROM and increase strength to improve the patients ability to improve standing tolerance     x min Therapeutic Activity:  []  See flow sheet :   Rationale: increase ROM and increase strength  to improve the patients ability to improve walking tolerance     8 min Manual Therapy:  STM to right quad, TFL   The manual therapy interventions were performed at a separate and distinct time from the therapeutic activities interventions. Rationale: decrease pain and increase ROM to improve standing tolerance           With   [] TE   [] TA   [] neuro   [] other: Patient Education: [x] Review HEP    [] Progressed/Changed HEP based on:   [] positioning   [] body mechanics   [] transfers   [] heat/ice application    [] other:      Other Objective/Functional Measures:     Fall Risk Assessment: Does the patient have a fear of falling? No  If yes, what fall risk assessment was performed?    5 time sit to stands: 12 seconds without UE support  SLS: left 30 seconds, right 17 seconds     Functional plantarflexion: left 18 HR repetitions, right 18 HR repetitions with pain increase to 1/10    Slump test: neg   IVONE: pos     SIJ: L posterior innominate rotation corrected with METS     Knee strength:   Right: flexion 4/5 p!, extension 4+/5  Left: flexion 4+/5, extension 4+/5     Hip strength:   Right: flexion 4/5, abduction 4+/5, extension 4-/5, glute max 4-/5  Left: flexion 4+/5, abduction 4+/5, extension 4-/5, glute max 4-/5    Supine bridge: 100%     Pain Level (0-10 scale) post treatment: 0.5/10     ASSESSMENT/Changes in Function:   Upon evaluation, the patient presents with back pain and radicular symptoms to the RLE. She presents with decreased B/L knee and hip strength, decreased right single leg balance tolerance. She presented with L posterior innominate rotation, easily corrected with METS. Provided initial HEP. The patient will benefit from skilled PT to address above limitations and improve QoL. Patient will continue to benefit from skilled PT services to modify and progress therapeutic interventions, address functional mobility deficits, address ROM deficits and address strength deficits to attain remaining goals. [x]  See Plan of Care  []  See progress note/recertification  []  See Discharge Summary         Progress towards goals / Updated goals:  Short term goals to be accomplished in 4 visits:   1. The pt will be IND and compliant with HEP and self management of symptoms. 2. The patient will perform SLS on right for 30 seconds to improve gait stability. Long term goals to be accomplished in 12 visits:   1. The patient will improve B/L knee strength to 5/5 to improve her standing tolerance. 2. The patient will improve B/L hip strength to 5/5 to improve gait stability. 3. The patient will improve B/L glute max strength to 5/5 to improve her standing tolerance. 4. The pt will improve FOTO score to 57/100 as a functional indicator of improved mobility.        PLAN  []  Upgrade activities as tolerated     [] Continue plan of care  []  Update interventions per flow sheet       []  Discharge due to:_  [x]  Other: the patient will benefit from skilled PT 2x/week for 8-12 visits      Justification for Eval Code Complexity:  Patient History : chronic back pain with radicular symptoms  Examination see exam   Clinical Presentation: evolving with changing characteristics   Clinical Decision Making : FOTO : 48 /100    Candida Gray PT 3/8/2022  2:49 PM    No future appointments.

## 2022-03-08 NOTE — PROGRESS NOTES
[address] [facility] AT [facility]  [address] [facility] [address] [place] [address],[address], [place], [place] [address]  Phone [phone]  Fax [phone] / [address] [address] PHYSICAL THERAPY SERVICES  Patient Name: Felix Estes : 1960   Medical   Diagnosis: Other low back pain [M54.59] Treatment Diagnosis: Back pain with radicular symptoms    Onset Date: 2022     Referral Source: [place] Agent Start of Care [facility]): 3/8/2022   Prior Hospitalization: See medical history Provider #: [id number]   Prior Level of Function: IND with all ADL's and household duties    Comorbidities: Depression, diabetes, arthritis, high blood pressure    Medications: Verified on Patient Summary List   The Plan of Care and following information is based on the information from the initial evaluation.   =================================================================================  Assessment / key information:    Subjective functional status/changes: The patient presents with right sided low back pain and radicular symptoms to her right foot that is chronic condition with acute exacerbation about 2 months ago. The patient denies falls or traumatic injury. X-rays shows anterolisthesis at L4-5 and arthritis per patient report. The patient reports increased pain with prolonged standing, sitting, getting into/out of the car. She is currently taking Mobic for pain relief. The patient enjoys crafting and gardening and has pain with both activities. The patient verbalized and indicated on her intake form no fear of falling. The patient's goals for physical therapy are to end or lessen pain. FOTO: 50/100. Other Objective/Functional Measures:   Fall Risk Assessment: Does the patient have a fear of falling? No  If yes, what fall risk assessment was performed?    5 time sit to stands: 12 seconds without UE support  SLS: left 30 seconds, right 17 seconds      Functional plantarflexion: left 18 HR repetitions, right 18 HR repetitions with pain increase to 1/10     Slump test: neg   IVONE: pos      SIJ: L posterior innominate rotation corrected with METS      Knee strength:   Right: flexion 4/5 p!, extension 4+/5  Left: flexion 4+/5, extension 4+/5      Hip strength:   Right: flexion 4/5, abduction 4+/5, extension 4-/5, glute max 4-/5  Left: flexion 4+/5, abduction 4+/5, extension 4-/5, glute max 4-/5     Supine bridge: 100%      Pain Level (0-10 scale) post treatment: 0.5/10      ASSESSMENT/Changes in Function:   Upon evaluation, the patient presents with back pain and radicular symptoms to the RLE. She presents with decreased B/L knee and hip strength, decreased right single leg balance tolerance. She presented with L posterior innominate rotation, easily corrected with METS. Provided initial HEP.  The patient will benefit from skilled PT to address above limitations and improve QoL.   =================================================================================  Eval Complexity: History: MEDIUM  Complexity : 1-2 comorbidities / personal factors will impact the outcome/ POC Exam:MEDIUM Complexity : 3 Standardized tests and measures addressing body structure, function, activity limitation and / or participation in recreation  Presentation: MEDIUM Complexity : Evolving with changing characteristics  Clinical Decision Making:MEDIUM Complexity : FOTO score of 26-74Overall Complexity:MEDIUM  Problem List: pain affecting function, decrease ROM, decrease strength, impaired gait/ balance and decrease ADL/ functional abilitiies   Treatment Plan may include any combination of the following: Therapeutic exercise, Therapeutic activities, Neuromuscular re-education, Physical agent/modality, Gait/balance training, Manual therapy and Patient education  Patient / Family readiness to learn indicated by: asking questions  Persons(s) to be included in education: patient (P)  Barriers to Learning/Limitations: None  Measures taken:    Patient Goal (s): End/lessen pain    Patient self reported health status: fair  Rehabilitation Potential: good    Short term goals to be accomplished in 4 visits:   1. The pt will be IND and compliant with HEP and self management of symptoms. 2. The patient will perform SLS on right for 30 seconds to improve gait stability.      Long term goals to be accomplished in 12 visits:   1. The patient will improve B/L knee strength to 5/5 to improve her standing tolerance. 2. The patient will improve B/L hip strength to 5/5 to improve gait stability. 3. The patient will improve B/L glute max strength to 5/5 to improve her standing tolerance. 4. The pt will improve FOTO score to 57/100 as a functional indicator of improved mobility. Frequency / Duration:   Patient to be seen  2  times per week for 8-12  treatments: (All LTG as above will be assessed and updated every 10 visits or 30 days and progressed as needed)    Patient / Caregiver education and instruction: brace/ splint application  Therapist Signature: Julia Pablo PT Date: [id number]   Certification Period: N/A Time: 4:51 PM   ===========================================================================================  I certify that the above Physical Therapy Services are being furnished while the patient is under my care. I agree with the treatment plan and certify that this therapy is necessary. Physician Signature:        Date:       Time:     Please sign and return to [facility] or you may fax the signed copy to [phone]. Thank you.   Naeem Abdalla,*

## 2022-03-10 ENCOUNTER — HOSPITAL ENCOUNTER (OUTPATIENT)
Dept: PHYSICAL THERAPY | Age: 62
Discharge: HOME | End: 2022-03-10
Payer: COMMERCIAL

## 2022-03-10 PROCEDURE — 97110 THERAPEUTIC EXERCISES: CPT | Performed by: PHYSICAL THERAPIST

## 2022-03-10 PROCEDURE — 97530 THERAPEUTIC ACTIVITIES: CPT | Performed by: PHYSICAL THERAPIST

## 2022-03-10 PROCEDURE — 97140 MANUAL THERAPY 1/> REGIONS: CPT | Performed by: PHYSICAL THERAPIST

## 2022-03-10 NOTE — PROGRESS NOTES
1205PT DAILY TREATMENT NOTE     Patient Name: Surjit Plan  Date:3/10/2022  : 1960  [x]  Patient  Verified  Payor: Elham Kellogg / Plan: Rekha Skillman / Product Type: Commerical /    In time: 1050am  Out time: 1205pm  Total Treatment Time (min): 75min  Visit #: 2 of       Treatment Area: Other low back pain [M54.59]    SUBJECTIVE  Pain Level (0-10 scale): 1/10   Any medication changes, allergies to medications, adverse drug reactions, diagnosis change, or new procedure performed?: [x] No    [] Yes (see summary sheet for update)  Subjective functional status/changes:   [] No changes reported  I forgot to do my HEP, no radiating pain today. Its been better since the Mobic.      OBJECTIVE    Modality rationale: decrease inflammation and decrease pain to improve the patients standing tolerance   Min Type Additional Details    [] Estim:  []Unatt       []IFC  []Premod                        []Other:  []w/ice   []w/heat  Position:  Location:    [] Estim: []Att    []TENS instruct  []NMES                    []Other:  []w/US   []w/ice   []w/heat  Position:  Location:    []  Traction: [] Cervical       []Lumbar                       [] Prone          []Supine                       []Intermittent   []Continuous Lbs:  [] before manual  [] after manual    []  Ultrasound: []Continuous   [] Pulsed                           []1MHz   []3MHz W/cm2:  Location:    []  Iontophoresis with dexamethasone         Location: [] Take home patch   [] In clinic   10 [x]  Ice     []  heat  []  Ice massage  []  Laser   []  Anodyne Position:supine  Location:l/s    []  Laser with stim  []  Other:  Position:  Location:    []  Vasopneumatic Device  Pre-treatment girth:   Post-treatment girth:   Measured at landmark location:  Pressure:       [] lo [] med [] hi   Temperature: [] lo [] med [] hi   [] Skin assessment post-treatment:  []intact []redness- no adverse reaction    []redness - adverse reaction:   Vasopnuematic compression justification:  Per bilateral girth measures taken and listed above the edema is considered significant and having an impact on the patient's balance and gait      47/42 min Therapeutic Exercise:  [x] See flow sheet :  HEP development and instruction   Posterior pelvic tilt  Straight leg raise    Rationale: increase ROM and increase strength to improve the patients ability to improve standing tolerance     10 min Therapeutic Activity:  []  See flow sheet : Bridge, mini squats, step ups   Rationale: increase ROM and increase strength  to improve the patients ability to improve walking tolerance     8 min Manual Therapy:  Alignment check , METs, L LE distraction    The manual therapy interventions were performed at a separate and distinct time from the therapeutic activities interventions. Rationale: decrease pain and increase ROM to improve standing tolerance           With   [] TE   [] TA   [] neuro   [] other: Patient Education: [x] Review HEP    [] Progressed/Changed HEP based on:   [] positioning   [] body mechanics   [] transfers   [] heat/ice application    [] other:      Other Objective/Functional Measures:   Initiated POC  VC required for TA/ppt for all therex  Good TA activation by end of session  SIJ:  L posterior innominate rotation and upslip corrected with METS    TTP over R ASIS    Pain Level (0-10 scale) post treatment: 0 numb/10     ASSESSMENT/Changes in Function:  Good tolerance to treatment today with patient req 100% verbal/tactile cueing and demo for proper form/technique with all newly introduced therex. Patient came in hard soled [facility] and had to take off for II bar activities, instructed to wear sneakers NV as she was unable to do toe raises today barefooted. Pt reports radicular sx have improved with Mobic.    Patient will continue to benefit from skilled PT services to modify and progress therapeutic interventions, address functional mobility deficits, address ROM deficits and address strength deficits to attain remaining goals. [x]  See Plan of Care  []  See progress note/recertification  []  See Discharge Summary         Progress towards goals / Updated goals:  Short term goals to be accomplished in 4 visits:   1. The pt will be IND and compliant with HEP and self management of symptoms. 2. The patient will perform SLS on right for 30 seconds to improve gait stability. Long term goals to be accomplished in 12 visits:   1. The patient will improve B/L knee strength to 5/5 to improve her standing tolerance. 2. The patient will improve B/L hip strength to 5/5 to improve gait stability. 3. The patient will improve B/L glute max strength to 5/5 to improve her standing tolerance. 4. The pt will improve FOTO score to 57/100 as a functional indicator of improved mobility.        PLAN  []  Upgrade activities as tolerated     []  Continue plan of care  []  Update interventions per flow sheet       []  Discharge due to:_  [x]  Other: assess goals    Karen Adrian, PT 3/10/2022  2:49 PM    Future Appointments   Date Time Provider Kinjal Miramontes   3/10/2022 10:45 AM Selene Laura, PT [facility]   3/16/2022  9:15 AM Harini Calderon, PT [facility]   3/18/2022  8:30 AM Luz Sandoval, PTA [facility]   3/21/2022  8:30 AM Harinicorry Calderon, PT [facility]   3/25/2022  8:30 AM Luz Sandoval, PTA [facility]   3/28/2022  8:30 AM Harini Calderon, PT [facility]   4/1/2022  8:30 AM Harini Kvng, PT [facility]   4/4/2022  8:30 AM Harini Kvng, PT [facility]   4/8/2022  8:30 AM Harini Calderon PT [facility]

## 2022-03-16 ENCOUNTER — HOSPITAL ENCOUNTER (OUTPATIENT)
Dept: PHYSICAL THERAPY | Age: 62
Discharge: HOME | End: 2022-03-16
Payer: COMMERCIAL

## 2022-03-16 PROCEDURE — 97530 THERAPEUTIC ACTIVITIES: CPT

## 2022-03-16 PROCEDURE — 97110 THERAPEUTIC EXERCISES: CPT

## 2022-03-16 PROCEDURE — 97140 MANUAL THERAPY 1/> REGIONS: CPT

## 2022-03-16 NOTE — PROGRESS NOTES
PT DAILY TREATMENT NOTE 11    Patient Name: Winnie Keen  Date:3/16/2022  : 1960  [x]  Patient  Verified  Payor: Alfred Day / Plan: Pj Herron / Product Type: Commerical /    In time: 9:17  Out time: 10:14   Total Treatment Time (min): 57   Visit #: 3 of -12      Treatment Area: Other low back pain [M54.59]    SUBJECTIVE  Pain Level (0-10 scale): 0.5/10   Any medication changes, allergies to medications, adverse drug reactions, diagnosis change, or new procedure performed?: [x] No    [] Yes (see summary sheet for update)  Subjective functional status/changes:   [] No changes reported  The patient reports radicular symptoms to her right heel today. She states she lost her HEP print out.      OBJECTIVE    Modality rationale: decrease inflammation and decrease pain to improve the patients standing tolerance   Min Type Additional Details    [] Estim:  []Unatt       []IFC  []Premod                        []Other:  []w/ice   []w/heat  Position:  Location:    [] Estim: []Att    []TENS instruct  []NMES                    []Other:  []w/US   []w/ice   []w/heat  Position:  Location:    []  Traction: [] Cervical       []Lumbar                       [] Prone          []Supine                       []Intermittent   []Continuous Lbs:  [] before manual  [] after manual    []  Ultrasound: []Continuous   [] Pulsed                           []1MHz   []3MHz W/cm2:  Location:    []  Iontophoresis with dexamethasone         Location: [] Take home patch   [] In clinic   10 [x]  Ice     []  heat  []  Ice massage  []  Laser   []  Anodyne Position:supine  Location:l/s    []  Laser with stim  []  Other:  Position:  Location:    []  Vasopneumatic Device  Pre-treatment girth:   Post-treatment girth:   Measured at landmark location:  Pressure:       [] lo [] med [] hi   Temperature: [] lo [] med [] hi   [] Skin assessment post-treatment:  []intact []redness- no adverse reaction    []redness - adverse reaction:   Vasopnuematic compression justification:  Per bilateral girth measures taken and listed above the edema is considered significant and having an impact on the patient's balance and gait      29  min Therapeutic Exercise:  [x] See flow sheet :  Bike  Incline stretch  Hamstring stretch  Hip 3 way  Heel raises, toe raises  Standing hamstring curl  Standing donkey kick  SLR flexion with TA activation -TC  PPT  PPT with ball/band    Rationale: increase ROM and increase strength to improve the patients ability to improve standing tolerance     10  min Therapeutic Activity:  []  See flow sheet :   Bridge,   mini squats - held pain   step ups fwd, lat  SLR flexion   Rationale: increase ROM and increase strength  to improve the patients ability to improve walking tolerance     8 min Manual Therapy:  No SIJ rotation today, roller to grastroc/achilles     The manual therapy interventions were performed at a separate and distinct time from the therapeutic activities interventions. Rationale: decrease pain and increase ROM to improve standing tolerance           With   [x] TE   [] TA   [] neuro   [] other: Patient Education: [x] Review HEP    [] Progressed/Changed HEP based on:   [] positioning   [] body mechanics   [] transfers   [] heat/ice application    [] other:      Other Objective/Functional Measures:   Improved tolerance to toe raises  SLS: right 23 seconds  Introduced resistance for standing HS curl and standing donkey kick   No SIJ rotation today     Pain Level (0-10 scale) post treatment: 0 /10      ASSESSMENT/Changes in Function:    Provided the patient with new copy of HEP. The patient had improved tolerance to toe raises today as she was wearing sneakers. She demonstrated improved tolerance to SLS on the right, making steady gains towards therapy goals. She presented without SIJ rotation today, noted increased right gastroc tightness during manual treatment. Continue to progress as tolerated.       Patient will continue to benefit from skilled PT services to modify and progress therapeutic interventions, address functional mobility deficits, address ROM deficits and address strength deficits to attain remaining goals. [x]  See Plan of Care  []  See progress note/recertification  []  See Discharge Summary         Progress towards goals / Updated goals:  Short term goals to be accomplished in 4 visits:   1. The pt will be IND and compliant with HEP and self management of symptoms. Reprinted and distributed 03/16/2022  2. The patient will perform SLS on right for 30 seconds to improve gait stability. Progressing 23 seconds 03/16/2022    Long term goals to be accomplished in 12 visits:   1. The patient will improve B/L knee strength to 5/5 to improve her standing tolerance. 2. The patient will improve B/L hip strength to 5/5 to improve gait stability. 3. The patient will improve B/L glute max strength to 5/5 to improve her standing tolerance. 4. The pt will improve FOTO score to 57/100 as a functional indicator of improved mobility.        PLAN  []  Upgrade activities as tolerated     []  Continue plan of care  []  Update interventions per flow sheet       []  Discharge due to:_  [x]  Other: assess goals    Tiara Soto, PT 3/16/2022  2:49 PM    Future Appointments   Date Time Provider Kinjal Miramontes   3/18/2022  8:30 AM Dulce Mills PTA [facility]   3/21/2022  8:30 AM Nieves Somers, PT [facility]   3/25/2022  8:30 AM Dulce Mills PTA [facility]   3/28/2022  8:30 AM Nieves Somers PT [facility]   4/1/2022  8:30 AM Nieves Somers, PT [facility]   4/4/2022  8:30 AM Nieves Somers, PT [facility]   4/8/2022  8:30 AM Nieves Somers, PT [facility]

## 2022-03-18 ENCOUNTER — HOSPITAL ENCOUNTER (OUTPATIENT)
Dept: PHYSICAL THERAPY | Age: 62
Discharge: HOME | End: 2022-03-18
Payer: COMMERCIAL

## 2022-03-18 PROCEDURE — 97140 MANUAL THERAPY 1/> REGIONS: CPT

## 2022-03-18 PROCEDURE — 97110 THERAPEUTIC EXERCISES: CPT

## 2022-03-18 PROCEDURE — 97530 THERAPEUTIC ACTIVITIES: CPT

## 2022-03-18 NOTE — PROGRESS NOTES
PT DAILY TREATMENT NOTE     Patient Name: Brian Wagoner  Date:3/18/2022  : 1960  [x]  Patient  Verified  Payor: [employer] / Plan: Diya Curtis / Product Type: Commerical /    In time: 8:30  Out time: 9:30  Total Treatment Time (min):   Visit #: 4 of       Treatment Area:  Other low back pain [M54.59]    SUBJECTIVE  Pain Level (0-10 scale): 3/10   Any medication changes, allergies to medications, adverse drug reactions, diagnosis change, or new procedure performed?: [x] No    [] Yes (see summary sheet for update)  Subjective functional status/changes:   [] No changes reported  I was in the shed yesterday for 2 hours looking for my gardening tools and now  my back and going down my (R) leg and into my (R) heel is hurting     OBJECTIVE    Modality rationale: decrease inflammation and decrease pain to improve the patients standing tolerance   Min Type Additional Details    [] Estim:  []Unatt       []IFC  []Premod                        []Other:  []w/ice   []w/heat  Position:  Location:    [] Estim: []Att    []TENS instruct  []NMES                    []Other:  []w/US   []w/ice   []w/heat  Position:  Location:    []  Traction: [] Cervical       []Lumbar                       [] Prone          []Supine                       []Intermittent   []Continuous Lbs:  [] before manual  [] after manual    []  Ultrasound: []Continuous   [] Pulsed                           []1MHz   []3MHz W/cm2:  Location:    []  Iontophoresis with dexamethasone         Location: [] Take home patch   [] In clinic   10 [x]  Ice     []  heat  []  Ice massage  []  Laser   []  Anodyne Position:supine  Location:l/s    []  Laser with stim  []  Other:  Position:  Location:    []  Vasopneumatic Device  Pre-treatment girth:   Post-treatment girth:   Measured at landmark location:  Pressure:       [] lo [] med [] hi   Temperature: [] lo [] med [] hi   [] Skin assessment post-treatment:  []intact []redness- no adverse reaction    []redness - adverse reaction:   Vasopnuematic compression justification:  Per bilateral girth measures taken and listed above the edema is considered significant and having an impact on the patient's balance and gait      20 min Therapeutic Exercise:  [x] See flow sheet :  Bike  Incline stretch  Hamstring stretch in supine with sheet  Piriformis stretch in supine with sheet   Hip 3 way -held  Heel raises, toe raises- held  Standing hamstring curl -held  Standing donkey kick -held  SLR flexion with TA activation -TC  PPT  PPT with ball/band    Rationale: increase ROM and increase strength to improve the patients ability to improve standing tolerance     10  min Therapeutic Activity:  []  See flow sheet :   Bridge,   mini squats - held pain   step ups fwd, lat - held  SLR flexion - held  2 way clams with green t-band    Rationale: increase ROM and increase strength  to improve the patients ability to improve walking tolerance     20 min Manual Therapy:  (R) posterior and (R) up-slip and (L) anterior rotation  Performed MET and leg pull to correct   sacral float and piriformis release to (R) side   The manual therapy interventions were performed at a separate and distinct time from the therapeutic activities interventions.    Rationale: decrease pain and increase ROM to improve standing tolerance           With   [x] TE   [] TA   [] neuro   [] other: Patient Education: [x] Review HEP    [] Progressed/Changed HEP based on:   [] positioning   [] body mechanics   [] transfers   [] heat/ice application    [] other:      Other Objective/Functional Measures:   (R) posterior and (R) up-slip and (L) anterior rotation  Performed MET and leg pull to correct   sacral float and piriformis release to (R) side  Changed hamstring stretch from standing to supine with sheet  Added 2 way clams with green t-band  Added piriformis stretch with sheet     Pain Level (0-10 scale) post treatment: 0 /10      ASSESSMENT/Changes in Function:    After SI correction, patient was able to ambulate with less (R) heel pain and only some low back discomfort, changing hamstring stretch from standing to supine with use of sheet, patient was able to report a better stretch to the lumbar area and into the hamstrings. Held standing exercises due to antalgic gait pattern upon arriving to therapy      Patient will continue to benefit from skilled PT services to modify and progress therapeutic interventions, address functional mobility deficits, address ROM deficits and address strength deficits to attain remaining goals. [x]  See Plan of Care  []  See progress note/recertification  []  See Discharge Summary         Progress towards goals / Updated goals:  Short term goals to be accomplished in 4 visits:   1. The pt will be IND and compliant with HEP and self management of symptoms. Reprinted and distributed 03/16/2022  2. The patient will perform SLS on right for 30 seconds to improve gait stability. Progressing 23 seconds 03/16/2022    Long term goals to be accomplished in 12 visits:   1. The patient will improve B/L knee strength to 5/5 to improve her standing tolerance. 2. The patient will improve B/L hip strength to 5/5 to improve gait stability. 3. The patient will improve B/L glute max strength to 5/5 to improve her standing tolerance. 4. The pt will improve FOTO score to 57/100 as a functional indicator of improved mobility.        PLAN  [x]  Upgrade activities as tolerated     [x]  Continue plan of care  []  Update interventions per flow sheet       []  Discharge due to:_  []  Other:    Shameka Dnig PTA 3/18/2022  2:49 PM    Future Appointments   Date Time Provider Kinjal Miramontes   3/21/2022  8:30 AM Verruss Díaz, PT [facility]   3/25/2022  8:30 AM Krista Miguel PTA [facility]   3/28/2022  8:30 AM Verruss Díaz, PT [facility]   4/1/2022  8:30 AM Verlenic Díaz, PT [facility]   4/4/2022  8:30 AM Verlenic Díaz, PT [facility]   4/8/2022  8:30 AM Fe Godfrey, Pauly, PT [facility] [address]

## 2022-03-21 ENCOUNTER — HOSPITAL ENCOUNTER (OUTPATIENT)
Dept: PHYSICAL THERAPY | Age: 62
Discharge: HOME | End: 2022-03-21
Payer: COMMERCIAL

## 2022-03-21 PROCEDURE — 97110 THERAPEUTIC EXERCISES: CPT

## 2022-03-21 PROCEDURE — 97530 THERAPEUTIC ACTIVITIES: CPT

## 2022-03-21 PROCEDURE — 97140 MANUAL THERAPY 1/> REGIONS: CPT

## 2022-03-21 NOTE — PROGRESS NOTES
PT DAILY TREATMENT NOTE     Patient Name: Brian Hutton  Date:3/21/2022  : 1960  [x]  Patient  Verified  Payor: Eleno Phalen / Plan: Elen Lunch / Product Type: Commerical /    In time: 8:32  Out time: 9:30   Total Treatment Time (min): 58   Visit #: 5 of       Treatment Area: Other low back pain [M54.59]    SUBJECTIVE  Pain Level (0-10 scale): 2/10   Any medication changes, allergies to medications, adverse drug reactions, diagnosis change, or new procedure performed?: [x] No    [] Yes (see summary sheet for update)  Subjective functional status/changes:   [] No changes reported  The patient reports she went on a walk with her dog for 1 block this morning, and is having more pain from her \"crotch to ankle\" on the right.      OBJECTIVE    Modality rationale: decrease inflammation and decrease pain to improve the patients standing tolerance   Min Type Additional Details    [] Estim:  []Unatt       []IFC  []Premod                        []Other:  []w/ice   []w/heat  Position:  Location:    [] Estim: []Att    []TENS instruct  []NMES                    []Other:  []w/US   []w/ice   []w/heat  Position:  Location:    []  Traction: [] Cervical       []Lumbar                       [] Prone          []Supine                       []Intermittent   []Continuous Lbs:  [] before manual  [] after manual    []  Ultrasound: []Continuous   [] Pulsed                           []1MHz   []3MHz W/cm2:  Location:    []  Iontophoresis with dexamethasone         Location: [] Take home patch   [] In clinic   10 [x]  Ice     []  heat  []  Ice massage  []  Laser   []  Anodyne Position:supine  Location:l/s    []  Laser with stim  []  Other:  Position:  Location:    []  Vasopneumatic Device  Pre-treatment girth:   Post-treatment girth:   Measured at landmark location:  Pressure:       [] lo [] med [] hi   Temperature: [] lo [] med [] hi   [] Skin assessment post-treatment:  []intact []redness- no adverse reaction    []redness - adverse reaction:   Vasopnuematic compression justification:  Per bilateral girth measures taken and listed above the edema is considered significant and having an impact on the patient's balance and gait      23 min Therapeutic Exercise:  [x] See flow sheet :  Bike  Incline stretch  Hamstring stretch in supine with sheet  Piriformis stretch in supine with sheet   Hip 3 way   Heel raises, toe raises - PD  Standing hamstring curl   Standing donkey kick   SLR flexion with TA activation   PPT  PPT with ball/band    Rationale: increase ROM and increase strength to improve the patients ability to improve standing tolerance     15  min Therapeutic Activity:  []  See flow sheet :   Bridge,   mini squats - p!   step ups fwd, lat    SLR flexion    2 way clams with green t-band    Rationale: increase ROM and increase strength  to improve the patients ability to improve walking tolerance     10 min Manual Therapy:  (R) posterior  (L) anterior rotation, STM to right piriformis, QL    The manual therapy interventions were performed at a separate and distinct time from the therapeutic activities interventions. Rationale: decrease pain and increase ROM to improve standing tolerance           With   [x] TE   [] TA   [] neuro   [] other: Patient Education: [x] Review HEP    [] Progressed/Changed HEP based on:   [] positioning   [] body mechanics   [] transfers   [] heat/ice application    [] other:      Other Objective/Functional Measures:   Gait: shortened step length right, antalgic gait     Pain Level (0-10 scale) post treatment: 0 /10      ASSESSMENT/Changes in Function:    The patient continued to present with right posterior innominate rotation. She demonstrated improved gait pattern and decreased pain following correction. Held heel raises and mini squats secondary to patient c/o increased pain with performance. She continues to feel improved symptoms following supine hamstring stretch with sheet.  Plan to progress as tolerated and assess goals nv. Patient will continue to benefit from skilled PT services to modify and progress therapeutic interventions, address functional mobility deficits, address ROM deficits and address strength deficits to attain remaining goals. [x]  See Plan of Care  []  See progress note/recertification  []  See Discharge Summary         Progress towards goals / Updated goals:  Short term goals to be accomplished in 4 visits:   1. The pt will be IND and compliant with HEP and self management of symptoms. Reprinted and distributed 03/16/2022  2. The patient will perform SLS on right for 30 seconds to improve gait stability. Progressing 23 seconds 03/16/2022    Long term goals to be accomplished in 12 visits:   1. The patient will improve B/L knee strength to 5/5 to improve her standing tolerance. 2. The patient will improve B/L hip strength to 5/5 to improve gait stability. 3. The patient will improve B/L glute max strength to 5/5 to improve her standing tolerance. 4. The pt will improve FOTO score to 57/100 as a functional indicator of improved mobility.        PLAN  [x]  Upgrade activities as tolerated     [x]  Continue plan of care  []  Update interventions per flow sheet       []  Discharge due to:_  [x]  Other: [address], PT 3/21/2022  2:49 PM    Future Appointments   Date Time Provider Kinjal Miramontes   3/25/2022  8:30 AM Zackary Hewitt [facility]   3/28/2022  8:30 AM Wilfred Lemus, PT [facility]   4/1/2022  8:30 AM Wilfred Lemus PT [facility]   4/4/2022  8:30 AM Wilfred Lemus, PT [facility]   4/8/2022  8:30 AM Wilfred Lemus PT [facility]

## 2022-03-25 ENCOUNTER — HOSPITAL ENCOUNTER (OUTPATIENT)
Dept: PHYSICAL THERAPY | Age: 62
Discharge: HOME | End: 2022-03-25
Payer: COMMERCIAL

## 2022-03-25 PROCEDURE — 97014 ELECTRIC STIMULATION THERAPY: CPT

## 2022-03-25 PROCEDURE — 97530 THERAPEUTIC ACTIVITIES: CPT

## 2022-03-25 PROCEDURE — 97110 THERAPEUTIC EXERCISES: CPT

## 2022-03-25 NOTE — PROGRESS NOTES
PT DAILY TREATMENT NOTE     Patient Name: Josué Meza  Date:3/25/2022  : 1960  [x]  Patient  Verified  Payor: Shannon Inmans / Plan: Lexi Lacey / Product Type: Commerical /    In time: 8:32  Out time: 9:25  Total Treatment Time (min): 53  Visit #: 6 of       Treatment Area:  Other low back pain [M54.59]    SUBJECTIVE  Pain Level (0-10 scale): 2/10   Any medication changes, allergies to medications, adverse drug reactions, diagnosis change, or new procedure performed?: [x] No    [] Yes (see summary sheet for update)  Subjective functional status/changes:   [] No changes reported  I still have pain going down my heel on the right side    OBJECTIVE    Modality rationale: decrease inflammation and decrease pain to improve the patients standing tolerance   Min Type Additional Details   15 [x] Estim:  [x]Unatt       [x]IFC  []Premod                        []Other:  [x]w/ice   []w/heat  Position:in prone with 2 pillows under hips  Location: lumbar sacral area    [] Estim: []Att    []TENS instruct  []NMES                    []Other:  []w/US   []w/ice   []w/heat  Position:  Location:    []  Traction: [] Cervical       []Lumbar                       [] Prone          []Supine                       []Intermittent   []Continuous Lbs:  [] before manual  [] after manual    []  Ultrasound: []Continuous   [] Pulsed                           []1MHz   []3MHz W/cm2:  Location:    []  Iontophoresis with dexamethasone         Location: [] Take home patch   [] In clinic    []  Ice     []  heat  []  Ice massage  []  Laser   []  Anodyne Position:supine  Location:l/s    []  Laser with stim  []  Other:  Position:  Location:    []  Vasopneumatic Device  Pre-treatment girth:   Post-treatment girth:   Measured at landmark location:  Pressure:       [] lo [] med [] hi   Temperature: [] lo [] med [] hi   [] Skin assessment post-treatment:  []intact []redness- no adverse reaction    []redness - adverse reaction:   Vasopnuematic compression justification:  Per bilateral girth measures taken and listed above the edema is considered significant and having an impact on the patient's balance and gait      25 min Therapeutic Exercise:  [x] See flow sheet :  Bike  Incline stretch  Hamstring stretch on step  Heel raises, toe raises  Added BELÉN, PPU and prone TKE with 2 pillows under hips      Held below for today  Piriformis stretch in supine with sheet   Hip 3 way   Standing hamstring curl   Standing donkey kick   SLR flexion with TA activation   PPT  PPT with ball/band    Rationale: increase ROM and increase strength to improve the patients ability to improve standing tolerance     10 min Therapeutic Activity:  []  See flow sheet :   Bridge, TC  mini squats -    step ups fwd, lat    SLR flexion  TC  2 way clams with green t-band TC   Rationale: increase ROM and increase strength  to improve the patients ability to improve walking tolerance     3 min Manual Therapy:  Checked SI -presents with no SI dysfunction    The manual therapy interventions were performed at a separate and distinct time from the therapeutic activities interventions. Rationale: decrease pain and increase ROM to improve standing tolerance           With   [] TE   [] TA   [] neuro   [] other: Patient Education: [x] Review HEP    [] Progressed/Changed HEP based on:   [] positioning   [] body mechanics   [] transfers   [] heat/ice application    [] other:      Other Objective/Functional Measures:    patient was able to perform hamstring stretch in standing today vs supine with sheet  No SI dysfunction   Attempted BELÉN,PPU and prone TKE to decrease referred pain in to the (R) LE with 2 pillows under hips   Added IFC with CP in prone     GOALS  1. The pt will be IND and compliant with HEP and self management of symptoms.  Patient reports partial compliance 3/25/2022    Pain Level (0-10 scale) post treatment: 0 /10      ASSESSMENT/Changes in Function:    Secondary to patient did not present with a SI dysfunction and no up-slip and continued to report referred pain down the (R) LE into the heel - attempted prone - Jesus Alberto exercises with positive responds with decreased referred pain into the (R) LE, attempted IFC with CP in prone with 2 pillows under the hips will assess the effects of today treatment and change from flexion based to extension based next visit  Patient will continue to benefit from skilled PT services to modify and progress therapeutic interventions, address functional mobility deficits, address ROM deficits and address strength deficits to attain remaining goals. [x]  See Plan of Care  []  See progress note/recertification  []  See Discharge Summary         Progress towards goals / Updated goals:  Short term goals to be accomplished in 4 visits:   1. The pt will be IND and compliant with HEP and self management of symptoms. Patient reports partial compliance 3/25/2022  2. The patient will perform SLS on right for 30 seconds to improve gait stability. Progressing 23 seconds 03/16/2022    Long term goals to be accomplished in 12 visits:   1. The patient will improve B/L knee strength to 5/5 to improve her standing tolerance. 2. The patient will improve B/L hip strength to 5/5 to improve gait stability. 3. The patient will improve B/L glute max strength to 5/5 to improve her standing tolerance. 4. The pt will improve FOTO score to 57/100 as a functional indicator of improved mobility.        PLAN  [x]  Upgrade activities as tolerated     [x]  Continue plan of care  []  Update interventions per flow sheet       []  Discharge due to:_  []  Other:      Jacky Morales, [facility] 3/25/2022  2:49 PM    Future Appointments   Date Time Provider Kinjal Miramontes   3/28/2022  8:30 AM Sin Estes, PT [facility]   4/1/2022  8:30 AM Sin Estes, PT [facility]   4/4/2022  8:30 AM Sin Estes, PT [facility]   4/8/2022  8:30 AM Sin Estes, PT [facility]

## 2022-03-28 ENCOUNTER — HOSPITAL ENCOUNTER (OUTPATIENT)
Dept: PHYSICAL THERAPY | Age: 62
Discharge: HOME | End: 2022-03-28
Payer: COMMERCIAL

## 2022-03-28 PROCEDURE — 97014 ELECTRIC STIMULATION THERAPY: CPT

## 2022-03-28 PROCEDURE — 97140 MANUAL THERAPY 1/> REGIONS: CPT

## 2022-03-28 PROCEDURE — 97530 THERAPEUTIC ACTIVITIES: CPT

## 2022-03-28 PROCEDURE — 97110 THERAPEUTIC EXERCISES: CPT

## 2022-03-28 NOTE — PROGRESS NOTES
PT DAILY TREATMENT NOTE 11    Patient Name: Pearlean Mortimer  Date:3/28/2022  : 1960  [x]  Patient  Verified  Payor: Brenda Masterson / Plan: Steph Carrion / Product Type: Commerical /    In time: 8:30  Out time: 9:40   Total Treatment Time (min): 70   Visit #: 7 of -      Treatment Area: Other low back pain [M54.59]    SUBJECTIVE  Pain Level (0-10 scale): 0/10   Any medication changes, allergies to medications, adverse drug reactions, diagnosis change, or new procedure performed?: [x] No    [] Yes (see summary sheet for update)  Subjective functional status/changes:   [] No changes reported  The patient reports increased headache after lying prone with her head rotated last visit, would prefer to not be in that position this visit.      OBJECTIVE    Modality rationale: decrease inflammation and decrease pain to improve the patients standing tolerance   Min Type Additional Details   10 [x] Estim:  [x]Unatt       [x]IFC  []Premod                        []Other:  [x]w/ice   []w/heat  Position:in right side lying  Location: LEFT lumbar sacral area    [] Estim: []Att    []TENS instruct  []NMES                    []Other:  []w/US   []w/ice   []w/heat  Position:  Location:    []  Traction: [] Cervical       []Lumbar                       [] Prone          []Supine                       []Intermittent   []Continuous Lbs:  [] before manual  [] after manual    []  Ultrasound: []Continuous   [] Pulsed                           []1MHz   []3MHz W/cm2:  Location:    []  Iontophoresis with dexamethasone         Location: [] Take home patch   [] In clinic    []  Ice     []  heat  []  Ice massage  []  Laser   []  Anodyne Position:supine  Location:l/s    []  Laser with stim  []  Other:  Position:  Location:    []  Vasopneumatic Device  Pre-treatment girth:   Post-treatment girth:   Measured at landmark location:  Pressure:       [] lo [] med [] hi   Temperature: [] lo [] med [] hi   [] Skin assessment post-treatment:  []intact []redness- no adverse reaction    []redness - adverse reaction:   Vasopnuematic compression justification:  Per bilateral girth measures taken and listed above the edema is considered significant and having an impact on the patient's balance and gait      28/23  min Therapeutic Exercise:  [x] See flow sheet :  Bike  Incline stretch  Hamstring stretch on step  Heel raises, toe raises  Piriformis stretch in supine with sheet   Hip 3 way   Standing hamstring curl -TC  Standing donkey kick -TC  PPT  PPT with ball/band    Rationale: increase ROM and increase strength to improve the patients ability to improve standing tolerance     20  min Therapeutic Activity:  []  See flow sheet :   Bridge  mini squats -    step ups fwd, lat    SLR flexion    2 way clams with green t-band    Rationale: increase ROM and increase strength  to improve the patients ability to improve walking tolerance     12 min Manual Therapy:  SIJ: left posterior innominate rotation corrected with METS, ART to left piriformis, supine hamstring stretching with nerve glide     The manual therapy interventions were performed at a separate and distinct time from the therapeutic activities interventions. Rationale: decrease pain and increase ROM to improve standing tolerance           With   [] TE   [] TA   [] neuro   [] other: Patient Education: [x] Review HEP    [] Progressed/Changed HEP based on:   [] positioning   [] body mechanics   [] transfers   [] heat/ice application    [] other:      Other Objective/Functional Measures:   L posterior innominate rotation  B/L hip strength globally 5/5 except for extension B/L 4+/5     Pain Level (0-10 scale) post treatment: 0 /10      ASSESSMENT/Changes in Function:    The patient presented today with L posterior innominate rotation today, corrected with METS. Good response to active release technique for piriformis tightness. She demonstrated improved hip strength globally, making steady gains towards LTG's. Continue to progress as tolerated nv. Patient will continue to benefit from skilled PT services to modify and progress therapeutic interventions, address functional mobility deficits, address ROM deficits and address strength deficits to attain remaining goals. [x]  See Plan of Care  []  See progress note/recertification  []  See Discharge Summary         Progress towards goals / Updated goals:  Short term goals to be accomplished in 4 visits:   1. The pt will be IND and compliant with HEP and self management of symptoms. Patient reports partial compliance 3/25/2022  2. The patient will perform SLS on right for 30 seconds to improve gait stability. Progressing 23 seconds 03/16/2022    Long term goals to be accomplished in 12 visits:   1. The patient will improve B/L knee strength to 5/5 to improve her standing tolerance. 2. The patient will improve B/L hip strength to 5/5 to improve gait stability. B/L hip strength globally 5/5 except for extension B/L 4+/5 03/28/2022  3. The patient will improve B/L glute max strength to 5/5 to improve her standing tolerance. 4. The pt will improve FOTO score to 57/100 as a functional indicator of improved mobility.        PLAN  [x]  Upgrade activities as tolerated     [x]  Continue plan of care  []  Update interventions per flow sheet       []  Discharge due to:_  [x]  Other:  Progress as tolerated Bing Toribio, PT 3/28/2022  2:49 PM    Future Appointments   Date Time Provider Kinjal Miramontes   4/1/2022  8:30 AM Oma Whiting, PT [facility]   4/4/2022  8:30 AM Oma Whiting, PT [facility]   4/8/2022  8:30 AM Oma Whiting, PT [facility]

## 2022-04-01 ENCOUNTER — HOSPITAL ENCOUNTER (OUTPATIENT)
Dept: PHYSICAL THERAPY | Age: 62
Discharge: HOME | End: 2022-04-01
Payer: COMMERCIAL

## 2022-04-01 PROCEDURE — 97110 THERAPEUTIC EXERCISES: CPT

## 2022-04-01 NOTE — PROGRESS NOTES
PT DAILY TREATMENT NOTE     Patient Name: Flaca Stanton  [id number]  : 1960  [x]  Patient  Verified  Payor: Farida Comfort / Plan: Princella Milford / Product Type: Commerical /    In time: 8:32  Out time: 9:10    Total Treatment Time (min): 38   Visit #: 8 of       Treatment Area:  Other low back pain [M54.59]    SUBJECTIVE  Pain Level (0-10 scale):  0.5/10   Any medication changes, allergies to medications, adverse drug reactions, diagnosis change, or new procedure performed?: [x] No    [] Yes (see summary sheet for update)  Subjective functional status/changes:   [] No changes reported  The patient presents today and state she wants today to be her last day of PT.     % improved: 85%  Functional gains: improved walking, improve standing tolerance to cook, decreased sharp stabbing pain in the foot, improved ability to get in and out of the car, improved tolerance to gardening, no longer taking taking Mobic for pain    Functional limitations: pain with prolonged sitting especially on harder surfaces   FOTO: 82/100     OBJECTIVE    Modality rationale: decrease inflammation and decrease pain to improve the patients standing tolerance   Min Type Additional Details   X [x] Estim:  [x]Unatt       [x]IFC  []Premod                        []Other:  [x]w/ice   []w/heat  Position:in right side lying  Location: LEFT lumbar sacral area    [] Estim: []Att    []TENS instruct  []NMES                    []Other:  []w/US   []w/ice   []w/heat  Position:  Location:    []  Traction: [] Cervical       []Lumbar                       [] Prone          []Supine                       []Intermittent   []Continuous Lbs:  [] before manual  [] after manual    []  Ultrasound: []Continuous   [] Pulsed                           []1MHz   []3MHz W/cm2:  Location:    []  Iontophoresis with dexamethasone         Location: [] Take home patch   [] In clinic    []  Ice     []  heat  []  Ice massage  []  Laser   []  Anodyne Position:supine  Location:l/s    []  Laser with stim  []  Other:  Position:  Location:    []  Vasopneumatic Device  Pre-treatment girth:   Post-treatment girth:   Measured at landmark location:  Pressure:       [] lo [] med [] hi   Temperature: [] lo [] med [] hi   [] Skin assessment post-treatment:  []intact []redness- no adverse reaction    []redness - adverse reaction:   Vasopnuematic compression justification:  Per bilateral girth measures taken and listed above the edema is considered significant and having an impact on the patient's balance and gait      38  min Therapeutic Exercise:  [x] See flow sheet :  REASSESSMENT, FOTO, HEP DEVELOPMENT   Bike  Incline stretch    TC:  Hamstring stretch on step  Heel raises, toe raises  Piriformis stretch in supine with sheet   Hip 3 way   Standing hamstring curl -TC  Standing donkey kick -TC  PPT  PPT with ball/band    Rationale: increase ROM and increase strength to improve the patients ability to improve standing tolerance     x min Therapeutic Activity:  []  See flow sheet :   Bridge  mini squats -    step ups fwd, lat    SLR flexion    2 way clams with green t-band    Rationale: increase ROM and increase strength  to improve the patients ability to improve walking tolerance     x min Manual Therapy:  SIJ: left posterior innominate rotation corrected with METS, ART to left piriformis, supine hamstring stretching with nerve glide     The manual therapy interventions were performed at a separate and distinct time from the therapeutic activities interventions.    Rationale: decrease pain and increase ROM to improve standing tolerance           With   [] TE   [] TA   [] neuro   [] other: Patient Education: [x] Review HEP    [] Progressed/Changed HEP based on:   [] positioning   [] body mechanics   [] transfers   [] heat/ice application    [] other:      Other Objective/Functional Measures:   Knee strength: B/L flexion 5/5, B/L extension 4+/5   Hip strength: B/L extension 4+/5  B/L glute max strength: 4+/5     Pain Level (0-10 scale) post treatment: 0 /10      ASSESSMENT/Changes in Function:    The patient presents today reporting that she is ready for discharge. She has made steady progress towards all therapy goals. Updated and reviewed HEP, educated patient on importance of compliance to maintain progress made in physical therapy. The patient will be discharged today with an updated HEP for IND management of symptoms. Patient will continue to benefit from skilled PT services to modify and progress therapeutic interventions, address functional mobility deficits, address ROM deficits and address strength deficits to attain remaining goals. []  See Plan of Care  []  See progress note/recertification  [x]  See Discharge Summary         Progress towards goals / Updated goals:  Short term goals to be accomplished in 4 visits:   1. The pt will be IND and compliant with HEP and self management of symptoms. Patient reports partial compliance 3/25/2022  2. The patient will perform SLS on right for 30 seconds to improve gait stability. Progressing 23 seconds 03/16/2022, LIMITED 7 seconds today 04/01/2022    Long term goals to be accomplished in 12 visits:   1. The patient will improve B/L knee strength to 5/5 to improve her standing tolerance. PROGRESSING  B/L flexion 5/5, B/L extension 4+/5 04/01/2022  2. The patient will improve B/L hip strength to 5/5 to improve gait stability. B/L hip strength globally 5/5 except for extension B/L 4+/5 03/28/2022  3. The patient will improve B/L glute max strength to 5/5 to improve her standing tolerance. PROGRESSING  4+/5 04/01/2022  4. The pt will improve FOTO score to 57/100 as a functional indicator of improved mobility.  MET 82/100 04/01/2022      PLAN  [x]  Upgrade activities as tolerated     [x]  Continue plan of care  []  Update interventions per flow sheet       [x]  Discharge due to: patient progress towards therapy goals   []  Other: Radha Miles, PT 4/1/2022  2:49 PM    No future appointments.

## 2022-04-01 NOTE — PROGRESS NOTES
[address] [facility]  [address] [place] [address],[address], Juan Jose goodrich, Bryce Huang  Phone [phone]  Fax  SUMMARY  Patient Name: Flaca Stanton : 1960   Treatment/Medical Diagnosis: Other low back pain [M54.59]   Referral Source: Erick Steinberg,*     Date of Initial Visit: 2022 Attended Visits: 8 Missed Visits: 0     SUMMARY OF TREATMENT  Thank you for this referral. The pt has been seen for 8 visits to address low back pain with radicular symptoms to the RLE. Therapeutic interventions have included therapeutic exercise, therapeutic activity, neuromuscular re-education, manual treatment, modalities and patient education. CURRENT STATUS  Subjective functional status/changes:   % improved: 85%  Functional gains: improved walking, improve standing tolerance to cook, decreased sharp stabbing pain in the foot, improved ability to get in and out of the car, improved tolerance to gardening, no longer taking taking Mobic for pain    Functional limitations: pain with prolonged sitting especially on harder surfaces   FOTO: 82/100     Other Objective/Functional Measures:   Knee strength: B/L flexion 5/5, B/L extension 4+/5   Hip strength: B/L extension 4+/5  B/L glute max strength: 4+/5         ASSESSMENT/Changes in Function:    The patient presents today reporting that she is ready for discharge. She has made steady progress towards all therapy goals. Updated and reviewed HEP, educated patient on importance of compliance to maintain progress made in physical therapy. The patient will be discharged today with an updated HEP for IND management of symptoms. Progress towards goals:   Short term goals to be accomplished in 4 visits:   1. The pt will be IND and compliant with HEP and self management of symptoms. Patient reports partial compliance 3/25/2022  2. The patient will perform SLS on right for 30 seconds to improve gait stability. Progressing 23 seconds 03/16/2022, LIMITED 7 seconds today 04/01/2022     Long term goals to be accomplished in 12 visits:   1. The patient will improve B/L knee strength to 5/5 to improve her standing tolerance. PROGRESSING  B/L flexion 5/5, B/L extension 4+/5 04/01/2022  2. The patient will improve B/L hip strength to 5/5 to improve gait stability. B/L hip strength globally 5/5 except for extension B/L 4+/5 03/28/2022  3. The patient will improve B/L glute max strength to 5/5 to improve her standing tolerance. PROGRESSING  4+/5 04/01/2022  4. The pt will improve FOTO score to 57/100 as a functional indicator of improved mobility. MET 82/100 04/01/2022    RECOMMENDATIONS  Discontinue therapy. Progressing towards or have reached established goals. If you have any questions/comments please contact us directly at [phone]. Thank you for allowing us to assist in the care of your patient.     Therapist Signature: Saleem Robertson PT Date: 04/01/2022     Time: 9:14 AM

## 2022-04-04 ENCOUNTER — APPOINTMENT (OUTPATIENT)
Dept: PHYSICAL THERAPY | Age: 62
End: 2022-04-04
Payer: COMMERCIAL

## 2022-04-08 ENCOUNTER — APPOINTMENT (OUTPATIENT)
Dept: PHYSICAL THERAPY | Age: 62
End: 2022-04-08
Payer: COMMERCIAL